# Patient Record
Sex: FEMALE | URBAN - METROPOLITAN AREA
[De-identification: names, ages, dates, MRNs, and addresses within clinical notes are randomized per-mention and may not be internally consistent; named-entity substitution may affect disease eponyms.]

---

## 2018-11-06 ENCOUNTER — HOSPITAL ENCOUNTER (OUTPATIENT)
Dept: PHYSICAL THERAPY | Age: 33
Discharge: HOME | End: 2018-11-06
Payer: OTHER GOVERNMENT

## 2018-11-06 PROCEDURE — 97530 THERAPEUTIC ACTIVITIES: CPT

## 2018-11-06 PROCEDURE — 97162 PT EVAL MOD COMPLEX 30 MIN: CPT
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
## 2018-11-13 ENCOUNTER — HOSPITAL ENCOUNTER (OUTPATIENT)
Dept: PHYSICAL THERAPY | Age: 33
Discharge: HOME | End: 2018-11-13
Payer: OTHER GOVERNMENT

## 2018-11-13 PROCEDURE — 97140 MANUAL THERAPY 1/> REGIONS: CPT

## 2018-11-13 PROCEDURE — 97110 THERAPEUTIC EXERCISES: CPT

## 2018-11-13 NOTE — PROGRESS NOTES
PELVIC FLOOR DAILY TREATMENT NOTE  Patient Name: Fara Flower Date:2018 : 1985 [x]  Patient  Verified Payor: ANTONETTE / Plan: Spring Brown / Product Type: Nicole Otis / In time:940  Out time:1020 Total Treatment Time (min): 40 Total Timed Codes (min):  
1:1 Treatment Time (min):   
Visit #: 2 of 12 Treatment Area: Pelvic and perineal pain [R10.2] SUBJECTIVE Pain Level (0-10 scale): 5 Any medication changes, allergies to medications, adverse drug reactions, diagnosis change, or new procedure performed?: [x] No    [] Yes (see summary sheet for update) Subjective functional status/changes:   [] No changes reported 
I'm on the tail end of my period so pain is a little more today, I feel it in my low abdomen and inside Able to tolerate a \"light\" tampon during menstrual cycles OBJECTIVE Modality rationale: decrease pain to improve the patients ability to tolerate manual therapy Type Additional Details  
[] Estim: []Att   []Unatt        []TENS instruct []IFC  []Premod   []NMES []Other:  []w/US   []w/ice   []w/heat Position: Location:  
[]  Ultrasound: []Continuous   [] Pulsed []1MHz   []3MHz Location: 
W/cm2:  
[]  Iontophoresis with dexamethasone Location: [] Take home patch  
[] In clinic  
[]  Ice     [x]  Heat--pre-tx 
[]  Ice massage Position: supine Location: low abdominal and groin  
[] Skin assessment post-treatment:  []intact []redness- no adverse reaction 
     []redness  adverse reaction:  
 
8 min Therapeutic Exercise:  [x] See flow sheet : 
[]  Pelvic floor strengthening                [x]  Pelvic floor downtraining 
[]  Quality pelvic floor contractions      [x]  Relaxation techniques []  Urge suppression exercises 
[x]  Other: breathing Rationale: increase ROM and decrease tone to improve the patients ability to perform ADLs without pain 22 min Manual Therapy: manual trigger point release to rectus and transverse abdominis mm, skin rolling over abdomen, MFR to low abdomen, mm play to B adductors and quads Rationale: decrease mm tone and pain for ADL tolerance T/o tx min Patient Education: [x] Review HEP    [] Progressed/Changed HEP based on:  
[] positioning   [] body mechanics   [] transfers   [] heat/ice application Other Objective/Functional Measures:  
 []baseline resting tone: nv 
 []slow twitch contractions: nv 
 []fast twitch contractions: nv 
 []PERFect score [Power (MMT), Endurance contraction length, Reps, Fast twitch reps]:  
     NA. 
 [x]most tender to palpation in low abdomen, TrPs throughout abdominals Pain Level (0-10 scale) post treatment: 6-7 ASSESSMENT/Changes in Function: good tolerance Patient will continue to benefit from skilled PT services to modify and progress therapeutic interventions, address ROM deficits, address strength deficits, analyze and address soft tissue restrictions and instruct in home and community integration to attain remaining goals. []  See Plan of Care 
[]  See progress note/recertification 
[]  See Discharge Summary Progress towards goals / Updated goals: 
   1) Patient compliant with HEP. 2) Patient will report > or = 25% improvement in pain with sitting, walking, intercourse. 3) Assess tone of PF mm via biofeedback. 4) The patient will be able to tolerate strength testing of pelvic floor muscles, will establish PERF score. PLAN [x]  Upgrade activities as tolerated     [x]  Continue plan of care 
[]  Update interventions per flow sheet      
[]  Discharge due to:_ 
[x]  Other:_biofeedback assessment, external superficial mm release, internal stretching and mm release, possible dilators for at home Aric Avina, PT 11/13/2018  9:50 AM 
 
 
Future Appointments Date Time Provider Louie Peng 12/3/2018  8:00 AM Jf Coe MD [address] [facility]

## 2018-11-20 ENCOUNTER — HOSPITAL ENCOUNTER (OUTPATIENT)
Dept: PHYSICAL THERAPY | Age: 33
Discharge: HOME | End: 2018-11-20
Payer: OTHER GOVERNMENT

## 2018-11-20 PROCEDURE — 97140 MANUAL THERAPY 1/> REGIONS: CPT

## 2018-11-20 NOTE — PROGRESS NOTES
PELVIC FLOOR DAILY TREATMENT NOTE 8- Patient Name: Shantel Ware Date:2018 : 1985 [x]  Patient  Verified Payor:  / Plan: Bubba Ritter / Product Type: Blondell Loss / In time:1250  Out time:115 Total Treatment Time (min): 25 Total Timed Codes (min):  
1:1 Treatment Time (min):   
Visit #: 3 of 12 Treatment Area: Pelvic and perineal pain [R10.2] SUBJECTIVE Pain Level (0-10 scale): 4 Any medication changes, allergies to medications, adverse drug reactions, diagnosis change, or new procedure performed?: [x] No    [] Yes (see summary sheet for update) Subjective functional status/changes:   [] No changes reported Feeling good, less pain today I was sore maybe til the next day after last visit, but then I believe it helped OBJECTIVE 25 min Manual Therapy: MFR and TPR to rectus abdominis, hip adductors, externally to superficial transverse perineal mm, obturator internus; internaly to levator ani mm, B OI; manual stretching of superficial PFm layer Rationale:  
   
T/o tx min Patient Education: [x] Review HEP    [] Progressed/Changed HEP based on:  
[] positioning   [] body mechanics   [] transfers   [] heat/ice application Other Objective/Functional Measures:  
 [x]patient most TTP externally to superficial transverse perineal mm B and obturator internus B Shortened treatment today due to patient being late for scheduled appt. Pain Level (0-10 scale) post treatment: 3 
 
ASSESSMENT/Changes in Function: decreased tone internally as compared to initial visit, patient able to tolerate deeper mm release Patient will continue to benefit from skilled PT services to modify and progress therapeutic interventions, address ROM deficits, analyze and address soft tissue restrictions and instruct in home and community integration to attain remaining goals. []  See Plan of Care 
[]  See progress note/recertification 
[]  See Discharge Summary Progress towards goals / Updated goals: 
  1) Patient compliant with HEP. 2) Patient will report > or = 25% improvement in pain with sitting, walking, intercourse. 3) Assess tone of PF mm via biofeedback. 4) The patient will be able to tolerate strength testing of pelvic floor muscles, will establish PERF score. PLAN [x]  Upgrade activities as tolerated     [x]  Continue plan of care 
[]  Update interventions per flow sheet      
[]  Discharge due to:_ 
[]  Other:_   
 
Maritza Sheldon PT 11/20/2018  5:05 PM 
 
 
Future Appointments Date Time Provider Louie Peng 11/27/2018 10:15 AM Alejandra Smith PT [facility]  
12/3/2018  8:00 AM Amalia Coe MD [facility]  
12/4/2018 11:00 AM Alejandra Smith PT [facility] AT [facility]  
12/11/2018 10:15 AM Alejandra Smith PT [facility] AT [facility]  
12/18/2018 10:15 AM Alejandra Smith PT [facility] AT [facility]

## 2018-11-27 ENCOUNTER — HOSPITAL ENCOUNTER (OUTPATIENT)
Dept: PHYSICAL THERAPY | Age: 33
Discharge: HOME | End: 2018-11-27
Payer: OTHER GOVERNMENT

## 2018-11-27 PROCEDURE — 97140 MANUAL THERAPY 1/> REGIONS: CPT

## 2018-11-27 NOTE — PROGRESS NOTES
PELVIC FLOOR DAILY TREATMENT NOTE 8- Patient Name: Odin Lee Date:2018 : 1985 [x]  Patient  Verified Payor: ANTONETTE / Plan: Greg Hooper 76 / Product Type: Curlene Massing / In time:1025  Out time:1110 Total Treatment Time (min): 45 Total Timed Codes (min):  
1:1 Treatment Time (min):   
Visit #: 4 of 12 Treatment Area: Pelvic and perineal pain [R10.2] SUBJECTIVE Pain Level (0-10 scale): 6 Any medication changes, allergies to medications, adverse drug reactions, diagnosis change, or new procedure performed?: [x] No    [] Yes (see summary sheet for update) Subjective functional status/changes:   [] No changes reported More soreness today. I think a lot of it is stress from seeing family this weekend and just a flareup too. The internal stretches from last visit really helped, I could tell a difference when I left. OBJECTIVE Modality rationale: decrease pain to improve the patients ability to ambulate Min Type Additional Details  
 [] Estim: []Att   []Unatt        []TENS instruct []IFC  []Premod   []NMES []Other:  []w/US   []w/ice   []w/heat Position: Location:  
 []  Ultrasound: []Continuous   [] Pulsed []1MHz   []3MHz Location: 
W/cm2:  
 []  Iontophoresis with dexamethasone Location: [] Take home patch  
[] In clinic  
during manual tx []  Ice     [x]  heat 
[]  Ice massage Position/location: low back, low abdomen in supine during manual therapy  
[] Skin assessment post-treatment:  []intact []redness- no adverse reaction 
     []redness  adverse reaction: 
 
45 min Manual Therapy: manual TPR externally to B obturator internus, superficial transvers perineal mm B f/b internal TPR to levator ani mm and obturator internus B, as well as static manual stretching. Manual TPR and skill rolling/tissue massage to abdominals Rationale: decrease mm tone and pain for ambulation, fxl mobility T/o tx min Patient Education: [x] Review HEP    [] Progressed/Changed HEP based on:  
[] positioning   [] body mechanics   [] transfers   [] heat/ice application Other Objective/Functional Measures:  
 [x]increased tone and tension internally, but decreased TrPs present in abdominals Pain Level (0-10 scale) post treatment: 5 
 
ASSESSMENT/Changes in Function: today's pain was inreased from outside fators, able to decrease tone and pain with manual treatment. Added another day of skilled PT per week to address more exercise and pain with increased frequency Patient will continue to benefit from skilled PT services to modify and progress therapeutic interventions, address ROM deficits, address strength deficits, analyze and address soft tissue restrictions, analyze and cue movement patterns, assess and modify postural abnormalities and instruct in home and community integration to attain remaining goals. []  See Plan of Care 
[]  See progress note/recertification 
[]  See Discharge Summary Progress towards goals / Updated goals: 
 1) Patient compliant with HEP.   
               2) Patient will report > or = 25% improvement in pain with sitting, walking, intercourse.   
               3) Assess tone of PF mm via biofeedback.  
               4) The patient will be able to tolerate strength testing of pelvic floor muscles, will establish PERF score.  
 
Will reassess at next Tuesday visit, 12/4/18 PLAN [x]  Upgrade activities as tolerated     [x]  Continue plan of care 
[]  Update interventions per flow sheet      
[]  Discharge due to:_ 
[]  Other:_   
 
Sarah Nava, PT 11/27/2018  10:28 AM 
 
 
Future Appointments Date Time Provider Louie Peng 12/3/2018  8:00 AM Pricilla Coe MD [facility] SCHED  
12/4/2018 11:00 AM Nany Jimenez, PT REH[facility] AT [facility]  
12/11/2018 10:15 AM Nany Jimenez, PT RE[facility]ENTER AT [facility]  
 12/18/2018 10:15 AM Wesly Seo PT [facility] AT [facility]

## 2018-12-04 ENCOUNTER — APPOINTMENT (OUTPATIENT)
Dept: PHYSICAL THERAPY | Age: 33
End: 2018-12-04
Payer: OTHER GOVERNMENT

## 2018-12-06 ENCOUNTER — APPOINTMENT (OUTPATIENT)
Dept: PHYSICAL THERAPY | Age: 33
End: 2018-12-06
Payer: OTHER GOVERNMENT

## 2018-12-11 ENCOUNTER — APPOINTMENT (OUTPATIENT)
Dept: PHYSICAL THERAPY | Age: 33
End: 2018-12-11
Payer: OTHER GOVERNMENT

## 2018-12-13 ENCOUNTER — APPOINTMENT (OUTPATIENT)
Dept: PHYSICAL THERAPY | Age: 33
End: 2018-12-13
Payer: OTHER GOVERNMENT

## 2018-12-18 ENCOUNTER — HOSPITAL ENCOUNTER (OUTPATIENT)
Dept: PHYSICAL THERAPY | Age: 33
Discharge: HOME | End: 2018-12-18
Payer: OTHER GOVERNMENT

## 2018-12-18 PROCEDURE — 97140 MANUAL THERAPY 1/> REGIONS: CPT

## 2018-12-18 PROCEDURE — 97110 THERAPEUTIC EXERCISES: CPT

## 2018-12-18 NOTE — PROGRESS NOTES
PELVIC FLOOR DAILY TREATMENT NOTE 8-14    Patient Name: Mellissa Isaac  Date:2018  : 1985  [x]  Patient  Verified  Payor:  / Plan: Galina Miller / Product Type:  /    In time:1015  Out time:1105  Total Treatment Time (min): 50  Total Timed Codes (min):   1:1 Treatment Time (min):    Visit #: 5 of 12    Treatment Area: Pelvic and perineal pain [R10.2]    SUBJECTIVE  Pain Level (0-10 scale): 3  Any medication changes, allergies to medications, adverse drug reactions, diagnosis change, or new procedure performed?: [x] No    [] Yes (see summary sheet for update)  Subjective functional status/changes:   [] No changes reported  Does not hurt as much when I pee  Sex is much more tolerable, I haven't torn as much   I just finished my period, and the days after are the most smooth sailing    OBJECTIVE  Modality rationale: decrease pain and increase tissue extensibility to improve the patients ability to ambulate   Min Type Additional Details    [] Estim: []Att   []Unatt        []TENS instruct                  []IFC  []Premod   []NMES                     []Other:  []w/US   []w/ice   []w/heat  Position:  Location:    []  Ultrasound: []Continuous   [] Pulsed                           []1MHz   []3MHz Location:  W/cm2:    []  Iontophoresis with dexamethasone         Location: [] Take home patch   [] In clinic   10 []  Ice     [x]  heat  []  Ice massage Position: supine  Location: pelvis and groin   [] Skin assessment post-treatment:  []intact []redness- no adverse reaction       []redness  adverse reaction:     10 min Therapeutic Exercise:  [x] See flow sheet :  []  Pelvic floor strengthening                [x]  Pelvic floor downtraining  []  Quality pelvic floor contractions      [x]  Relaxation techniques  []  Urge suppression exercises  []  Other:    Rationale: increase ROM to improve the patients ability to ambulate    30 min Manual Therapy: internal reassessment of PERF, manual TPR to levator ani and layer 1 mm   Rationale: decrease pain and improve ROM/PF excursion for ambulation and ADLs      T/o tx min Patient Education: [x] Review HEP    [] Progressed/Changed HEP based on:   [] positioning   [] body mechanics   [] transfers   [] heat/ice application        Other Objective/Functional Measures:    []baseline resting tone: nv   []slow twitch contractions: nv   []fast twitch contractions: nv   [x]PERFect score [Power (MMT), Endurance contraction length, Reps, Fast twitch reps]:        3/10/10/10. Pain Level (0-10 scale) post treatment: 5    ASSESSMENT/Changes in Function: patient demonstrating improvements in pain level and insertional activities    Patient will continue to benefit from skilled PT services to modify and progress therapeutic interventions, address ROM deficits, analyze and address soft tissue restrictions, analyze and cue movement patterns, analyze and modify body mechanics/ergonomics and instruct in home and community integration to attain remaining goals.      []  See Plan of Care  [x]  See progress note/recertification  []  See Discharge Summary         Progress towards goals / Updated goals:  See PN    PLAN  [x]  Upgrade activities as tolerated     [x]  Continue plan of care  []  Update interventions per flow sheet       []  Discharge due to:_  []  Other:_      Dina Silver, PT 12/18/2018  10:22 AM      Future Appointments   Date Time Provider Louie Peng   12/20/2018  1:00 PM Dajuan Pena, PT REH[facility]ENTER AT [facility]   12/27/2018 11:00 AM Lidia Hurt, PT REH[facility]ENTER AT [facility]

## 2018-12-18 NOTE — PROGRESS NOTES
[address] PHYSICAL THERAPY AT [address], [address] [place] [address], [address] [facility] [address] [address], [address] [place] [address]  Phone: [phone]  Fax: [phone]  PROGRESS NOTE  Patient Name: Olivia Hoyt : 1985   Treatment/Medical Diagnosis: Pelvic and perineal pain [R10.2]   Referral Source: Saud Dalal *     Date of Initial Visit: 18 Attended Visits: 5 Missed Visits: 1     SUMMARY OF TREATMENT: initial evaluation, therapeutic activity, therapeutic exercise, manual therapy, patient education    GOALS:  1) Patient compliant with HEP. 2) Patient will report > or = 25% improvement in pain with sitting, walking, intercourse. 3) Assess tone of PF mm via biofeedback. 4) The patient will be able to tolerate strength testing of pelvic floor muscles, will establish PERF score. Patient is compliant with all HEP instructions  Patient reports > 25% improvement in pain with all ADLs  Biofeedback assessment deferred due to time constraints  PERF score 3/10/10/10, without pain during testing, but residual pain after. KEY FUNCTIONAL CHANGES/PROGRESS: The patient has progressed well with skilled therapy in regards to pain level with ADLs. Patient demonstrates WNL gait pattern indicating less pelvic pain and reports 3/10 pain today. She was able to tolerate mm strength testing of the pelvic floor muscles without discomfort and demonstrates WNL strength. We have discussed the use of dilator therapy and patient is ordering a set for static stretching of the pelvic floor muscles. She continues to strain and have some difficulty with voiding. NEW GOALS to be achieved in __4-6__  Weeks:  1) Patient independent in HEP. 2) Patient will report > or = 50% improvement in pain with sitting, walking, intercourse. 3) Decrease score on FOTO/Pelvic Pain to < or = 57 to indicate improved quality of life.     4) Patient to demo WNL resting tone of PF mm for relaxed voiding and painfree ADLs.    RECOMMENDATIONS: Continue skilled PT services as above 2x4-6 weeks. If you have any questions/comments please contact us directly at [phone]. Thank you for allowing us to assist in the care of your patient. Therapist Signature: Vasquez Reid, PT, DPT, MTC, CMTPT Date: 12/18/2018     Time: 11:03 AM   NOTE TO PHYSICIAN:  PLEASE COMPLETE THE ORDERS BELOW AND FAX TO   [facility]: [phone]. If you are unable to process this request in 24 hours please contact our office: [phone].    ___ I have read the above report and request that my patient continue as recommended.   ___ I have read the above report and request that my patient continue therapy with the following changes/special instructions:_________________________________________________________   ___ I have read the above report and request that my patient be discharged from therapy.      Physician Signature:        Date:       Time:

## 2018-12-20 ENCOUNTER — HOSPITAL ENCOUNTER (OUTPATIENT)
Dept: PHYSICAL THERAPY | Age: 33
Discharge: HOME | End: 2018-12-20
Payer: OTHER GOVERNMENT

## 2018-12-20 PROCEDURE — 97140 MANUAL THERAPY 1/> REGIONS: CPT | Performed by: PHYSICAL THERAPIST

## 2018-12-20 NOTE — PROGRESS NOTES
PHYSICAL THERAPY - DAILY TREATMENT NOTE    Patient Name: Kwan Garcia        Date: 2018  : 1985   YES Patient  Verified  Visit #:   6 (1)   of   12  Insurance: Payor:  / Plan: Britt Mckenna / Product Type:  /      In time: 2 Out time: 250   Total Treatment Time: 50     TREATMENT AREA =  Pelvic and perineal pain [R10.2]    SUBJECTIVE  Pain Level (on 0 to 10 scale):  4  / 10   Medication Changes/New allergies or changes in medical history, any new surgeries or procedures? NO    If yes, update Summary List   Subjective Functional Status/Changes:  []  No changes reported     Functional improvements: tolerates touching abdomen is easier  Functional impairments: ongoing pain in abdomen. OBJECTIVE    50 min Manual Therapy: Technique:      Visceral manipulation: ICV, Sphincter of Oddi, pyloric sphincter releases. Duodenum release. Rationale:      decrease pain, increase ROM and increase tissue extensibility to improve patient's ability to  improve patient's ability to perform ADL's with decreased pain        throughout therapy min Patient Education:  YES  Reviewed HEP   []  Progressed/Changed HEP based on: Other Objective/Functional Measures:    Tight through abdomen     Post Treatment Pain Level (on 0 to 10) scale:   3  / 10     ASSESSMENT  Assessment/Changes in Function:     The pt reported increased \"release\" less tension in lower abdomen post visceral.       []  See Progress Note/Recertification   Patient will continue to benefit from skilled PT services to modify and progress therapeutic interventions, address functional mobility deficits, address ROM deficits, address strength deficits, analyze and address soft tissue restrictions and analyze and cue movement patterns to attain remaining goals.    Progress toward goals / Updated goals:    Progressing towards goals, will monitor and progress as able        PLAN  [x]  Upgrade activities as tolerated YES Continue plan of care   []  Discharge due to :    []  Other:      Therapist: Deb Meade PT    Date: 12/20/2018 Time: 2:06 PM     Future Appointments   Date Time Provider Louie Peng   12/27/2018 11:00 AM Levon Dobbins, PT [facility] AT [facility]   1/3/2019 11:45 AM Nany Jimenez, PT [facility] AT [facility]   1/8/2019 11:45 AM Nany Jimenez, PT [facility] AT [facility]

## 2018-12-27 ENCOUNTER — HOSPITAL ENCOUNTER (OUTPATIENT)
Dept: PHYSICAL THERAPY | Age: 33
Discharge: HOME | End: 2018-12-27
Payer: OTHER GOVERNMENT

## 2018-12-27 PROCEDURE — 97140 MANUAL THERAPY 1/> REGIONS: CPT | Performed by: PHYSICAL THERAPIST

## 2018-12-27 PROCEDURE — 97110 THERAPEUTIC EXERCISES: CPT | Performed by: PHYSICAL THERAPIST

## 2018-12-27 NOTE — PROGRESS NOTES
PHYSICAL THERAPY - DAILY TREATMENT NOTE    Patient Name: Adrián Blake        Date: 2018  : 1985   YES Patient  Verified  Visit #:   7 (2)   of   12  Insurance: Payor:  / Plan: Allie Eastman / Product Type:  /      In time:  Out time:    Total Treatment Time: 50     TREATMENT AREA =  Pelvic and perineal pain [R10.2]    SUBJECTIVE  Pain Level (on 0 to 10 scale):  8  / 10   Medication Changes/New allergies or changes in medical history, any new surgeries or procedures? NO    If yes, update Summary List   Subjective Functional Status/Changes:  []  No changes reported     C/o significant LBP today to B paraspinals likely secondary to standing and cooking for very long hours. Barely able to walk today. Reports that she felt better after last visit to her lower abdomen. OBJECTIVE    40 min Manual Therapy: Technique:      MET to correct R anterior, then posterior rotation, R hip innominant lateral rotation, L on L unilateral L sacrum flexed, unilateral R sacrum extended. TrP release to B glute med. Rationale:      decrease pain, increase ROM and increase tissue extensibility to improve patient's ability to tolerate standing. 10 min Therapeutic Exercise:  [x]  See flow sheet   Rationale:      increase ROM and increase strength to improve the patients ability to tolerate rolling/standing. throughout therapy min Patient Education:  YES  Reviewed HEP   []  Progressed/Changed HEP based on: Other Objective/Functional Measures:    Instructed the pt in proper body mechanics for log rolling, golfers' reach, squat to avoid rotating. Instructed to order SI belt. R hip squish test (+). R hip innom  Level following session. Post Treatment Pain Level (on 0 to 10) scale:   7  / 10     ASSESSMENT  Assessment/Changes in Function:     Slight reduction in pain after SI mobs. Trigger points to B glut med very painful to release.   Attempted to instruct in Pre-pilates for core stab. She tolerated SI alignment and was aligned following session. TA contraction went well, but BKFO increased abdominal pain. []  See Progress Note/Recertification   Patient will continue to benefit from skilled PT services to modify and progress therapeutic interventions, address functional mobility deficits, address ROM deficits, address strength deficits, analyze and address soft tissue restrictions and analyze and cue movement patterns to attain remaining goals.    Progress toward goals / Updated goals:    Progressing towards goals, will monitor and progress as able        PLAN  [x]  Upgrade activities as tolerated YES Continue plan of care   []  Discharge due to :    []  Other:      Therapist: Shanthi Skinner, PT    Date: 12/27/2018 Time: 10:18 AM     Future Appointments   Date Time Provider Louie Peng   12/27/2018 11:00 AM Ivelisse Hurt, PT [facility] AT [facility]   1/3/2019 11:45 AM Dang Link, PT [facility] AT [facility]   1/8/2019 11:45 AM Dang Link, PT RE[facility]ENTER AT [facility]

## 2019-01-03 ENCOUNTER — HOSPITAL ENCOUNTER (OUTPATIENT)
Dept: PHYSICAL THERAPY | Age: 34
Discharge: HOME | End: 2019-01-03
Payer: OTHER GOVERNMENT

## 2019-01-03 PROCEDURE — 97140 MANUAL THERAPY 1/> REGIONS: CPT

## 2019-01-03 PROCEDURE — 97110 THERAPEUTIC EXERCISES: CPT

## 2019-01-03 NOTE — PROGRESS NOTES
PELVIC FLOOR DAILY TREATMENT NOTE 8- Patient Name: Sherman Foy Date:1/3/2019 : 1985 [x]  Patient  Verified Payor: ANTONETTE / Plan: Alley Agustin / Product Type: Clemon Hope / In time:12  Out time:1235 Total Treatment Time (min): 30 Total Timed Codes (min):  
1:1 Treatment Time (min):   
Visit #: 8 of 12 Treatment Area: Pelvic and perineal pain [R10.2] SUBJECTIVE Pain Level (0-10 scale): 10 Any medication changes, allergies to medications, adverse drug reactions, diagnosis change, or new procedure performed?: [x] No    [] Yes (see summary sheet for update) Subjective functional status/changes:   [] No changes reported \"I'm still in so much pain from the pelvic manipulation, but i've been wearing my belt\" OBJECTIVE Modality rationale: decrease pain and increase tissue extensibility to improve the patients ability to perform ADLs, sleep Type Additional Details  
[] Estim: []Att   []Unatt        []TENS instruct []IFC  []Premod   []NMES []Other:  []w/US   []w/ice   []w/heat Position: Location:  
[]  Ultrasound: []Continuous   [] Pulsed []1MHz   []3MHz Location: 
W/cm2:  
[]  Iontophoresis with dexamethasone Location: [] Take home patch  
[] In clinic  
[]  Ice     [x]  heat 
[]  Ice massage Position: supine with LEs elevated Location: thoracolumbar  
[] Skin assessment post-treatment:  []intact []redness- no adverse reaction 
     []redness  adverse reaction:  
 
15 min Therapeutic Exercise:  [x]  See flow sheet: pelvic breathing, cat-cow, child's pose, HEP review, SI belt use (overlapped with heat treatment) Rationale: decrease pain for ADLs 15 min Manual Therapy: ES mm play, STM/DTM, TPR to B thoraco paraspinals and B QLs Rationale: decrease trigger points and pain to improve tissue extensibility for ADLs, sleeping T/o tx min Patient Education: [x] Review HEP    [] Progressed/Changed HEP based on:  
[] positioning   [] body mechanics   [] transfers   [] heat/ice application Other Objective/Functional Measures:  
 Patient reports 10-20% decrease in pain post-manual treatment Patient visibly in a lot of pain Pelvic alignment and LE length even and WNL Cut treatment time short because of patient's pain level Pain Level (0-10 scale) post treatment: 8 
 
ASSESSMENT/Changes in Function: Due to patient's level pelvis, I advised her to decrease wear time of the SI belt, with the goal  Being to decrease some of the thoracolumbar mm guarding, She was unable to tolerate any other treatment today due to pain. Patient will continue to benefit from skilled PT services to modify and progress therapeutic interventions, address functional mobility deficits, address ROM deficits, address strength deficits, analyze and address soft tissue restrictions, analyze and cue movement patterns, analyze and modify body mechanics/ergonomics, assess and modify postural abnormalities and instruct in home and community integration to attain remaining goals. []  See Plan of Care 
[]  See progress note/recertification 
[]  See Discharge Summary Progress towards goals / Updated goals: Working toward WNL pelvic floor mm tone PLAN [x]  Upgrade activities as tolerated     [x]  Continue plan of care 
[]  Update interventions per flow sheet      
[]  Discharge due to:_ 
[]  Other:_   
 
Radha Kapadia, PT 1/3/2019  12:43 PM 
 
 
Future Appointments Date Time Provider Louie Peng 1/8/2019 11:45 AM Ron Handy, PT [facility] AT [facility]

## 2019-01-08 ENCOUNTER — HOSPITAL ENCOUNTER (OUTPATIENT)
Dept: PHYSICAL THERAPY | Age: 34
Discharge: HOME | End: 2019-01-08
Payer: OTHER GOVERNMENT

## 2019-01-08 PROCEDURE — 97530 THERAPEUTIC ACTIVITIES: CPT

## 2019-01-08 PROCEDURE — 97110 THERAPEUTIC EXERCISES: CPT

## 2019-01-08 PROCEDURE — 97140 MANUAL THERAPY 1/> REGIONS: CPT

## 2019-01-08 NOTE — PROGRESS NOTES
PELVIC FLOOR DAILY TREATMENT NOTE 8- Patient Name: Celso Bronson Date:2019 : 1985 [x]  Patient  Verified Payor: ANTONETTE / Plan: Greg Hooper 74 / Product Type: Nonnie Critz / In time:1150  Out time:1245 Total Treatment Time (min): 55 Total Timed Codes (min):  
1:1 Treatment Time (min):   
Visit #: 9 of 12 Treatment Area: Pelvic and perineal pain [R10.2] SUBJECTIVE Pain Level (0-10 scale): 5 Any medication changes, allergies to medications, adverse drug reactions, diagnosis change, or new procedure performed?: [x] No    [] Yes (see summary sheet for update) Subjective functional status/changes:   [] No changes reported Functional improvement: increased mobility and decreased pain since last visit Functional limitations: increased pelvic pain due to being on menstrual cycle OBJECTIVE Modality rationale: decrease pain and increase tissue extensibility to improve the patients ability to perform ADLs Min Type Additional Details  
 [] Estim: []Att   []Unatt        []TENS instruct []IFC  []Premod   []NMES []Other:  []w/US   []w/ice   []w/heat Position: Location:  
 []  Ultrasound: []Continuous   [] Pulsed []1MHz   []3MHz Location: 
W/cm2:  
 []  Iontophoresis with dexamethasone Location: [] Take home patch  
[] In clinic  
10 []  Ice     [x]  heat 
[]  Ice massage Position: supine with LEs over bolster Location: lumbar spine, ant pelvis  
[x] Skin assessment post-treatment:  [x]intact []redness- no adverse reaction 
     []redness  adverse reaction:  
 
25 min Therapeutic Exercise:  [x] See flow sheet : 
[]  Pelvic floor strengthening                [x]  Pelvic floor downtraining 
[]  Quality pelvic floor contractions      [x]  Relaxation techniques []  Urge suppression exercises 
[x]  Other: pelvic breathing, pelvic clock, pre-Pilates Rationale: increase strength, improve coordination and increase proprioception to improve the patients ability to perform ADLs 10 min Therapeutic Activity:  [x]  See flow sheet: relaxed voiding strategies, use of warm perineal washer bottle 
 [] Bladder Training: [] voiding schedule          [] program progression 
                                  [] decrease every  minutes/hours Rationale: decrease mm tone, enhance relaxation for voiding 10 min Manual Therapy: manual TPR to B QL/PSs, glute max and pirifiormis in supine Rationale: decrease TrPs and pain for ADLs 
   
T/o tx min Patient Education: [x] Review HEP    [] Progressed/Changed HEP based on:  
[] positioning   [] body mechanics   [] transfers   [] heat/ice application Other Objective/Functional Measures:  
Patient demos good control with pelvic clock and TA contraction She reports \"pinching\" in her low back with oblique pelvic movements Increased tone and TrPs in B glutes/piriformis mm 
 
Pain Level (0-10 scale) post treatment: 5 
 
ASSESSMENT/Changes in Function: difficult to differentiate menstrual cycle pain and patient's usual pelvic pain today. Focused on pelvic control and breath for advancing abdominal strengthening. Patient will continue to benefit from skilled PT services to modify and progress therapeutic interventions, address ROM deficits, address strength deficits, analyze and address soft tissue restrictions, analyze and cue movement patterns, analyze and modify body mechanics/ergonomics and instruct in home and community integration to attain remaining goals. []  See Plan of Care 
[]  See progress note/recertification 
[]  See Discharge Summary Progress towards goals / Updated goals: 
Pain increases lately due to menstrual cycle and somatovisceral tissue release, but overall pain steadily decreasing PLAN [x]  Upgrade activities as tolerated     [x]  Continue plan of care []  Update interventions per flow sheet      
[]  Discharge due to:_ 
[]  Other:_   
 
Prince Valentino, PT 1/8/2019  11:54 AM 
 
 
No future appointments.

## 2019-01-15 ENCOUNTER — APPOINTMENT (OUTPATIENT)
Dept: PHYSICAL THERAPY | Age: 34
End: 2019-01-15
Payer: OTHER GOVERNMENT

## 2019-01-17 ENCOUNTER — APPOINTMENT (OUTPATIENT)
Dept: PHYSICAL THERAPY | Age: 34
End: 2019-01-17
Payer: OTHER GOVERNMENT

## 2019-01-22 ENCOUNTER — APPOINTMENT (OUTPATIENT)
Dept: PHYSICAL THERAPY | Age: 34
End: 2019-01-22
Payer: OTHER GOVERNMENT

## 2019-01-24 ENCOUNTER — APPOINTMENT (OUTPATIENT)
Dept: PHYSICAL THERAPY | Age: 34
End: 2019-01-24
Payer: OTHER GOVERNMENT

## 2019-01-31 ENCOUNTER — APPOINTMENT (OUTPATIENT)
Dept: PHYSICAL THERAPY | Age: 34
End: 2019-01-31
Payer: OTHER GOVERNMENT

## 2019-02-05 ENCOUNTER — APPOINTMENT (OUTPATIENT)
Dept: PHYSICAL THERAPY | Age: 34
End: 2019-02-05
Payer: OTHER GOVERNMENT

## 2019-02-07 ENCOUNTER — HOSPITAL ENCOUNTER (OUTPATIENT)
Dept: PHYSICAL THERAPY | Age: 34
Discharge: HOME | End: 2019-02-07
Payer: OTHER GOVERNMENT

## 2019-02-07 PROCEDURE — 97140 MANUAL THERAPY 1/> REGIONS: CPT | Performed by: PHYSICAL THERAPIST

## 2019-02-07 PROCEDURE — 97110 THERAPEUTIC EXERCISES: CPT | Performed by: PHYSICAL THERAPIST

## 2019-02-07 NOTE — PROGRESS NOTES
Jack Perez [facility] PHYSICAL THERAPY AT [address] [facility], [address] [place] [address] [place], [address] [place] [address] Drive, [address] [place] [address]  Phone: [phone]  Fax: [phone] PROGRESS NOTE Patient Name: Dagoberto Yousif : 1985 Treatment/Medical Diagnosis: Pelvic and perineal pain [R10.2] Referral Source: Jose Miguel Soares * Date of Initial Visit: 18 Attended Visits: 10 Missed Visits: 3 SUMMARY OF TREATMENT: initial evaluation, therapeutic activity, therapeutic exercise, manual therapy, patient education GOALS: 
 1) Patient independent in HEP. 2) Patient will report > or = 50% improvement in pain with sitting, walking, intercourse. 3) Decrease score on FOTO/Pelvic Pain to < or = 57 to indicate improved quality of life. 4) Patient to demo WNL resting tone of PF mm for relaxed voiding and painfree ADLs. Patient is compliant with all HEP instructions Patient reports > 25% improvement in pain and ADLs. Encouraged by pelvic floor getting slightly more relaxed as she had slight urinary incontinence. FOTO deferred this date due to time constraint. The pt reports 5% increased tolerance for intercourse. Biofeedback assessment deferred due to time constraints KEY FUNCTIONAL CHANGES/PROGRESS: The patient has progressed well with skilled therapy in regards to pain level with ADLs. Patient demonstrates WNL gait pattern indicating less pelvic pain and reports 5/10 pain today. She is demonstrating a little less straining with voiding as she reports one incidence of \"dribbling\" a few drops. NEW GOALS to be achieved in __4-6__  Weeks: 
1) Patient independent in HEP. 2) Patient will report > or = 50% improvement in pain with sitting, walking, intercourse. Able to void with increased ease and had \"dribbles\". 3) Decrease score on FOTO/Pelvic Pain to < or = 57 to indicate improved quality of life. 4) Patient to demo WNL resting tone of PF mm for relaxed voiding and painfree ADLs. RECOMMENDATIONS: Continue skilled PT services as above 2x4-6 weeks. If you have any questions/comments please contact us directly at [phone]. Thank you for allowing us to assist in the care of your patient. Therapist Signature: Faiza Carty PT Date: 2/7/2019 Time: 11:03 AM  
NOTE TO PHYSICIAN:  PLEASE COMPLETE THE ORDERS BELOW AND FAX TO [facility] Physical Therapy: [phone]. If you are unable to process this request in 24 hours please contact our office: [phone]. 
 
___ I have read the above report and request that my patient continue as recommended.  
___ I have read the above report and request that my patient continue therapy with the following changes/special instructions:_________________________________________________________  
___ I have read the above report and request that my patient be discharged from therapy.   
 
Physician Signature:       Date:      Time:

## 2019-02-07 NOTE — PROGRESS NOTES
PHYSICAL THERAPY - DAILY TREATMENT NOTE Patient Name: Josef Lord        Date: 2019 : 1985   YES Patient  Verified Visit #:   10   of   12  Insurance: Payor:  / Plan: Sunil Bernard / Product Type: Court Santamaria / In time: 1030 Out time: 1115 Total Treatment Time: 45 Medicare/BCBS Time Tracking (below) Total Timed Codes (min):  - 1:1 Treatment Time:  -  
TREATMENT AREA =  Pelvic and perineal pain [R10.2] SUBJECTIVE Pain Level (on 0 to 10 scale):  5  / 10 Medication Changes/New allergies or changes in medical history, any new surgeries or procedures? NO    If yes, update Summary List  
Subjective Functional Status/Changes:  []  No changes reported The pt states that she's missed PT due to stomach illness and traveled out of town. Doing about 15% better. R hip went into spasm last night with walking. Reports that she is doing better with ability to relax her pelvic floor and even had a few \"dribbles\" before reaching toilet. OBJECTIVE Modalities Rationale:     decrease pain to improve patient's ability to  improve patient's ability to perform ADL's with decreased pain 
 
 min [] Estim, type/location:   
                                 []  att     []  unatt     []  w/US     []  w/ice    []  w/heat 
 min []  Mechanical Traction: type/lbs   
               []  pro   []  sup   []  int   []  cont    []  before manual    []  after manual  
 min []  Ultrasound, settings/location:    
 min []  Iontophoresis w/ dexamethasone, location:   
                                           []  take home patch       []  in clinic  
10 min []  Ice     [x]  Heat    location/position: Lumbar, R hip  
 min []  Vasopneumatic Device, press/temp:   
 min []  Other:   
[x] Skin assessment post-treatment (if applicable):   
[x]  intact    []  redness- no adverse reaction    
[]redness  adverse reaction:   
 
15 min Therapeutic Exercise:  [x]  See flow sheet Rationale:      increase ROM and increase strength to improve the patients ability to have reduced back pain for walking. 20 min Manual Therapy: Technique:     
[x] S/DTM []IASTM []PROM [] Passive Stretching  
[]manual TPR []Jt manipulation:Gr I [] II []  III [] IV[] V[] Treatment Area:  R adductor, R QL and glut med Rationale:      decrease pain, increase ROM and increase tissue extensibility to improve patient's ability to sit, walk, have intercourse by relaxing tone to pelvic floor Billed With/As: 
 [] TE 
 [] TA 
 [] Neuro 
 [] Self Care Patient Education: [x] Review HEP [] Progressed/Changed HEP based on:  
[] positioning   [] body mechanics   [] transfers   [] heat/ice application   
[] other:   
Other Objective/Functional Measures: 
 
Spasticity to the R QL, glute med. Tolerates slightly increased pressure to adductor Post Treatment Pain Level (on 0 to 10) scale:   5  / 10 ASSESSMENT Assessment/Changes in Function:  
 
See re-eval 
  
[]  See Progress Note/Recertification Patient will continue to benefit from skilled PT services to modify and progress therapeutic interventions, address functional mobility deficits, address ROM deficits, address strength deficits, analyze and address soft tissue restrictions, analyze and cue movement patterns, analyze and modify body mechanics/ergonomics and assess and modify postural abnormalities to attain remaining goals. Progress toward goals / Updated goals: 
See PN  
PLAN [x]  Upgrade activities as tolerated YES Continue plan of care  
[]  Discharge due to :   
[]  Other:   
 
Therapist: Aleisha Rosario PT Date: 2/7/2019 Time: 10:36 AM  
 
Future Appointments Date Time Provider Louie Peng 2/12/2019 10:15 AM Loni Serrano PT REHAB [facility]ENTER AT [facility]  
2/19/2019 10:15 AM Loni Serrano PT REH[facility]ENTER AT [facility]  
2/26/2019  9:30 AM Loni Serrano PT REHAB CENTER AT [facility]

## 2019-02-12 ENCOUNTER — HOSPITAL ENCOUNTER (OUTPATIENT)
Dept: PHYSICAL THERAPY | Age: 34
Discharge: HOME | End: 2019-02-12
Payer: OTHER GOVERNMENT

## 2019-02-12 PROCEDURE — 97110 THERAPEUTIC EXERCISES: CPT

## 2019-02-12 NOTE — PROGRESS NOTES
PELVIC FLOOR DAILY TREATMENT NOTE 1- Patient Name: Monique Alvarado        Date: 2019 : 1985   YES  Patient  Verified Visit #:     Insurance: Payor:  / Plan: Vesta Dickerson / Product Type: Creasie Decree / In time: 1025 Out time: 1055 Total Treatment Time: 30 Medicare/[facility] Time Tracking (below) Total Timed Codes (min):   1:1 Treatment Time:    
TREATMENT AREA =  Pelvic and perineal pain [R10.2] SUBJECTIVE Pain Level (on 0 to 10 scale):  6  / 10 Medication Changes/New allergies or changes in medical history, any new surgeries or procedures? No If yes, update Summary List  
Subjective Functional Status/Changes:  []  No changes reported Functional improvement: having less pain overall Functional limitations: pelvic pain with ADLs limiting activity tolerance OBJECTIVE 30 min Therapeutic Exercise:  [x] See flow sheet : 
[]  Pelvic floor strengthening                []  Pelvic floor downtraining 
[]  Quality pelvic floor contractions      []  Relaxation techniques []  Urge suppression exercises 
[]  Other: Biofeedback assessment via sEMG--relaxation, positioning Rationale:      increase ROM, increase strength and improve coordination to improve the patients ability to tolerate ADLs Billed With/As: 
 [x] TE 
 [] TA 
 [] Neuro 
 [] Self Care Patient Education: [x] Review HEP [] Progressed/Changed HEP based on:  
[] positioning   [] body mechanics   [] transfers   [] heat/ice application   
[] other:   
Other Objective/Functional Measures: 
 
sEMG readings: H/L 38 mV Supine with LEs extended 15 mV 
R S/L 51 mV R S/L with B hip FLX 45 mV Supine with ankles crossed 4 mV DKC/happy baby 7 mV Standing 7 mV Post Treatment Pain Level (on 0 to 10) scale:   5  / 10 ASSESSMENT Assessment/Changes in Function:  
 
Patient demonstrated good understanding of the ability to relax and contract pelvic floor muscles w use of BFB []  See Progress Note/Recertification Patient will continue to benefit from skilled PT services to modify and progress therapeutic interventions, address functional mobility deficits, address ROM deficits, address strength deficits, analyze and address soft tissue restrictions, analyze and cue movement patterns, analyze and modify body mechanics/ergonomics, assess and modify postural abnormalities and instruct in home and community integration to attain remaining goals. Progress toward goals / Updated goals: 
1) Patient independent in HEP.   
2) Patient will report > or = 50% improvement in pain with sitting, walking, intercourse.  Able to void with increased ease and had \"dribbles\". 3) Decrease score on FOTO/Pelvic Pain to < or = 57 to indicate improved quality of life.   
4) Patient to demo WNL resting tone of PF mm for relaxed voiding and painfree ADLs. PLAN [x]  Upgrade activities as tolerated yes Continue plan of care  
[]  Discharge due to :   
[]  Other:   
 
Therapist: Marques Mojica PT, DPT, MTC, CMTPT Date: 2/12/2019 Time: 10:35 AM  
 
Future Appointments Date Time Provider Louie Peng 2/19/2019 10:15 AM Rosana Middleton PT REHAB [facility] AT [facility]  
2/26/2019  9:30 AM Rosana Middleton PT REH[facility] AT [facility]

## 2019-02-19 ENCOUNTER — HOSPITAL ENCOUNTER (OUTPATIENT)
Dept: PHYSICAL THERAPY | Age: 34
Discharge: HOME | End: 2019-02-19
Payer: OTHER GOVERNMENT

## 2019-02-19 PROCEDURE — 97110 THERAPEUTIC EXERCISES: CPT

## 2019-02-19 PROCEDURE — 97140 MANUAL THERAPY 1/> REGIONS: CPT

## 2019-02-19 NOTE — PROGRESS NOTES
PELVIC FLOOR DAILY TREATMENT NOTE  Patient Name: Jordyn Watters        Date: 2019 : 1985   YES  Patient  Verified Visit #:   15   of   24  Insurance: Payor: ANTONETTE / Plan: Lloyd Valadez / Product Type: Merry Leak / In time: 1025 Out time: 28 Total Treatment Time: 35 Medicare/[facility] Time Tracking (below) Total Timed Codes (min):   1:1 Treatment Time:    
TREATMENT AREA =  Pelvic and perineal pain [R10.2] SUBJECTIVE Pain Level (on 0 to 10 scale):  6  / 10 Medication Changes/New allergies or changes in medical history, any new surgeries or procedures? No If yes, update Summary List  
Subjective Functional Status/Changes:  []  No changes reported Functional improvement: 10% improvement in intercourse pain Functional limitations: pain throughout ADLs, limited activity tolerance OBJECTIVE 10 min Therapeutic Exercise:  [x] See flow sheet : 
[]  Pelvic floor strengthening                [x]  Pelvic floor downtraining 
[]  Quality pelvic floor contractions      [x]  Relaxation techniques []  Urge suppression exercises 
[]  Other:   
Rationale:      increase ROM and improve coordination to improve the patients ability to tolerate ADLs 25 min Manual Therapy: IASTM to R adductors f/b manual STM; internal TPR to OI, levator ani, cuing for relaxation Rationale:      decrease pain, increase ROM, increase tissue extensibility and decrease trigger points to improve patient's ability to tolerate ADLs Billed With/As: 
 [x] TE 
 [] TA 
 [] Neuro 
 [] Self Care Patient Education: [x] Review HEP [] Progressed/Changed HEP based on:  
[] positioning   [] body mechanics   [] transfers   [] heat/ice application   
[] other:   
Other Objective/Functional Measures: 
 
Patient demos significant tenderness to palpation internally and externally on R side. Obturator internus most tender to palpation.   
Educated patient again in dilator therapy and self-TPR tool as well as relaxation techniques, especially for use pre-intercourse Post Treatment Pain Level (on 0 to 10) scale:   5  / 10 ASSESSMENT Assessment/Changes in Function:  
 
Slowly, but steadily improving pain with ADLs such as intercourse  
[]  See Progress Note/Recertification Patient will continue to benefit from skilled PT services to modify and progress therapeutic interventions, address functional mobility deficits, address ROM deficits, analyze and address soft tissue restrictions, analyze and cue movement patterns, analyze and modify body mechanics/ergonomics and instruct in home and community integration to attain remaining goals. Progress toward goals / Updated goals: 
1) Patient independent in HEP.   
2) Patient will report > or = 50% improvement in pain with sitting, walking, intercourse.  Able to void with increased ease and had \"dribbles\". 3) Decrease score on FOTO/Pelvic Pain to < or = 57 to indicate improved quality of life.   
4) Patient to demo WNL resting tone of PF mm for relaxed voiding and painfree ADLs. PLAN [x]  Upgrade activities as tolerated yes Continue plan of care  
[]  Discharge due to :   
[]  Other:   
 
Therapist: Dawn Hebert, PT, DPT, MTC, CMTPT Date: 2/19/2019 Time: 12:02 PM  
 
Future Appointments Date Time Provider Louie Peng 2/26/2019  9:30 AM Francisco Alvarado PT [facility]  
3/12/2019  9:30 AM Francisco Alvarado, PT [facility] AT [facility]  
3/14/2019 11:30 AM Geri Hurt PT RE[facility] AT [facility]  
3/19/2019  9:30 AM Francisco Alvarado PT [facility]  
3/21/2019 11:30 AM Geri Hurt PT [facility] AT [facility]  
3/26/2019  9:30 AM Francisco Alvarado PT [facility]  
3/28/2019 11:30 AM Geri Hurt, PT RE[facility] AT [facility]

## 2019-02-26 ENCOUNTER — HOSPITAL ENCOUNTER (OUTPATIENT)
Dept: PHYSICAL THERAPY | Age: 34
Discharge: HOME | End: 2019-02-26
Payer: OTHER GOVERNMENT

## 2019-02-26 PROCEDURE — 97140 MANUAL THERAPY 1/> REGIONS: CPT

## 2019-02-26 NOTE — PROGRESS NOTES
PELVIC FLOOR DAILY TREATMENT NOTE 1- Patient Name: Neelima Riojas        Date: 2019 : 1985   YES  Patient  Verified Visit #:   15   of   24  Insurance: Payor: ANTONETTE / Plan: Greg Hooper 74 / Product Type: Lalito Stammer / In time: 935 Out time: 5915 Total Treatment Time: 40 Medicare/[facility] Time Tracking (below) Total Timed Codes (min):   1:1 Treatment Time:    
TREATMENT AREA =  Pelvic and perineal pain [R10.2] SUBJECTIVE Pain Level (on 0 to 10 scale):  5  / 10 Medication Changes/New allergies or changes in medical history, any new surgeries or procedures? No If yes, update Summary List  
Subjective Functional Status/Changes:  []  No changes reported Functional improvement: ordered dilators in order to begin dilator therapy Functional limitations: low back, R inner thigh, internal soreness and pain OBJECTIVE 40 min Manual Therapy: Internal assessment of PF mm tone/TrPs; MFR/DTM to R adductor, B lumbar, piriformis, hip mm; prone sacral scrubbing and manual TPR to B piriformis and glutes Rationale:      decrease pain, increase ROM, increase tissue extensibility, decrease trigger points and increase postural awareness to improve patient's ability to perform ADLs Billed With/As: 
 [x] MT 
 [] TA 
 [] Neuro 
 [] Self Care Patient Education: [x] Review HEP [] Progressed/Changed HEP based on:  
[] positioning   [] body mechanics   [] transfers   [] heat/ice application   
[] other:   
Other Objective/Functional Measures: 
 
Internally, PF mm tone felt normal. No increased tension or guarding felt. Patient very tender to palpation of R adductor. TrPs present t/o lumbar and hip mm, especially in B hip rotators Patient ordered equipment for dilator therapy at home Patient states she read up on vaginismus and it really resonated with her--the pain during and especially after intercourse Post Treatment Pain Level (on 0 to 10) scale:   0  / 10 ASSESSMENT Assessment/Changes in Function:  
 
Improving mm tone/pain Weakness in PF and abdominals evident by occasional (1xweek) OCTAVIO 
  
[]  See Progress Note/Recertification Patient will continue to benefit from skilled PT services to modify and progress therapeutic interventions, address functional mobility deficits, address ROM deficits, address strength deficits, analyze and address soft tissue restrictions, analyze and cue movement patterns, analyze and modify body mechanics/ergonomics, assess and modify postural abnormalities and instruct in home and community integration to attain remaining goals. Progress toward goals / Updated goals: 
1) Patient independent in HEP.   
2) Patient will report > or = 50% improvement in pain with sitting, walking, intercourse.  Able to void with increased ease and had \"dribbles\". 3) Decrease score on FOTO/Pelvic Pain to < or = 57 to indicate improved quality of life.   
4) Patient to demo WNL resting tone of PF mm for relaxed voiding and painfree ADLs. PLAN [x]  Upgrade activities as tolerated yes Continue plan of care  
[]  Discharge due to :   
[]  Other:   
 
Therapist: Elizabeth Tyson, PT, DPT, MTC, CMTPT Date: 2/26/2019 Time: 9:39 AM  
 
Future Appointments Date Time Provider Louie Peng 3/12/2019  9:30 AM Edwin Mayorga, PT [facility] AT [facility]  
3/14/2019 11:30 AM Nhi Hurt, PT [facility] AT [facility]  
3/19/2019  9:30 AM Edwin Lipoma, PT [facility] AT [facility]  
3/21/2019 11:30 AM Nhi Hurt, PT [facility] AT [facility]  
3/26/2019  9:30 AM Edwin Lipoma, PT [facility] AT [facility]  
3/28/2019 11:30 AM Nhi Hurt, PT [facility] AT [facility]

## 2019-03-12 ENCOUNTER — APPOINTMENT (OUTPATIENT)
Dept: PHYSICAL THERAPY | Age: 34
End: 2019-03-12
Payer: OTHER GOVERNMENT

## 2019-03-14 ENCOUNTER — APPOINTMENT (OUTPATIENT)
Dept: PHYSICAL THERAPY | Age: 34
End: 2019-03-14
Payer: OTHER GOVERNMENT

## 2019-03-19 ENCOUNTER — HOSPITAL ENCOUNTER (OUTPATIENT)
Dept: PHYSICAL THERAPY | Age: 34
Discharge: HOME | End: 2019-03-19
Payer: OTHER GOVERNMENT

## 2019-03-19 PROCEDURE — 97110 THERAPEUTIC EXERCISES: CPT

## 2019-03-19 PROCEDURE — 97530 THERAPEUTIC ACTIVITIES: CPT

## 2019-03-19 PROCEDURE — 97140 MANUAL THERAPY 1/> REGIONS: CPT

## 2019-03-19 NOTE — PROGRESS NOTES
PELVIC FLOOR DAILY TREATMENT NOTE  Patient Name: Willy Bryant        Date: 3/19/2019 : 1985   YES  Patient  Verified Visit #:     Insurance: Payor: ANTONETTE / Plan: Nasreen Vallejo / Product Type: Armando Ramírez / In time: 940 Out time: 1030 Total Treatment Time: 50 Medicare/[facility] Time Tracking (below) Total Timed Codes (min):   1:1 Treatment Time:    
TREATMENT AREA =  Pelvic and perineal pain [R10.2] SUBJECTIVE Pain Level (on 0 to 10 scale):  7.5  / 10 Medication Changes/New allergies or changes in medical history, any new surgeries or procedures? No If yes, update Summary List  
Subjective Functional Status/Changes:  []  No changes reported Functional improvement: received dilator set Functional limitations: still on period, unable to do any internal therapy today. Spasms following orgasm are debilitatingly painful. OBJECTIVE Modalities Rationale:     decrease pain and increase tissue extensibility to improve patient's ability to ambulate, perform ADLs 
 min [] Estim, type/location:   
                                 []  att     []  unatt     []  w/US     []  w/ice    []  w/heat 
 min []  Mechanical Traction: type/lbs   
               []  pro   []  sup   []  int   []  cont    []  before manual    []  after manual  
 min []  Ultrasound, settings/location:    
 min []  Iontophoresis w/ dexamethasone, location:   
                                           []  take home patch       []  in clinic  
(15 during tx) min []  Ice     [x]  Heat    location/position: Supine with pad to lower pelvis  
 min []  Vasopneumatic Device, press/temp:   
 min []  Other:   
[] Skin assessment post-treatment (if applicable):   
[]  intact    []  redness- no adverse reaction    
[]redness  adverse reaction:     
25 min Therapeutic Exercise:  [x] See flow sheet : 
[]  Pelvic floor strengthening                [x]  Pelvic floor downtraining []  Quality pelvic floor contractions      [x]  Relaxation techniques []  Urge suppression exercises 
[x]  Other: restorative yoga poses Rationale:      increase ROM, improve coordination and increase proprioception to improve the patients ability to ambulate, perform ADLs 15 min Manual Therapy: Manual TPR to R adductor and quad Rationale:      decrease pain, increase ROM, increase tissue extensibility, decrease trigger points and increase postural awareness to improve patient's ability to [id number] min Therapeutic Activity: [x]  See flow sheet 
[] Bladder Training: [x] voiding schedule 
[x] program progression Rationale:    increase ROM, improve coordination and increase proprioception to improve the patients ability to perform ADLs Billed With/As: 
 [x] TE 
 [x] TA 
 [] Neuro 
 [] Self Care Patient Education: [x] Review HEP [] Progressed/Changed HEP based on:  
[] positioning   [] body mechanics   [] transfers   [] heat/ice application   
[] other:   
Other Objective/Functional Measures: 
 
Tender to superficial palpation of R adductors and quad, TrPs present t/o Hypermobility of B hips evident in happy baby pose Able to perform all restorative yoga poses except sphinx and thoracic stretch without pain Post Treatment Pain Level (on 0 to 10) scale:   5  / 10 ASSESSMENT Assessment/Changes in Function:  
 
Still experiencing significant pain due to chronic IBS and IC Unable to make last 3 visits due to traveling and sickness 
  
[]  See Progress Note/Recertification Patient will continue to benefit from skilled PT services to modify and progress therapeutic interventions, address functional mobility deficits, address ROM deficits, address strength deficits, analyze and address soft tissue restrictions, analyze and cue movement patterns, analyze and modify body mechanics/ergonomics, assess and modify postural abnormalities and instruct in home and community integration to attain remaining goals. Progress toward goals / Updated goals: 
Establish specific HEP strategies for pain relief, stretching, strengthening Decrease resting PF mm tone via downtraining strategies so patient is able to strengthen PF mm to decrease OCTAVIO. PLAN [x]  Upgrade activities as tolerated yes Continue plan of care  
[]  Discharge due to :   
[]  Other:   
 
Therapist: Alondra Hendrickson, PT, DPT, MTC, CMTPT Date: 3/19/2019 Time: 9:42 AM  
 
Future Appointments Date Time Provider Louie Peng 3/21/2019 11:30 AM Maggie Hurt, PT REH[facility] AT [facility]  
3/26/2019  9:30 AM Cesia Fermin, PT [facility] AT [facility]  
3/28/2019 11:30 AM Maggie Hurt, PT [facility] AT [facility]

## 2019-03-21 ENCOUNTER — APPOINTMENT (OUTPATIENT)
Dept: PHYSICAL THERAPY | Age: 34
End: 2019-03-21
Payer: OTHER GOVERNMENT

## 2019-03-26 ENCOUNTER — APPOINTMENT (OUTPATIENT)
Dept: PHYSICAL THERAPY | Age: 34
End: 2019-03-26
Payer: OTHER GOVERNMENT

## 2019-03-28 ENCOUNTER — HOSPITAL ENCOUNTER (OUTPATIENT)
Dept: PHYSICAL THERAPY | Age: 34
Discharge: HOME | End: 2019-03-28
Payer: OTHER GOVERNMENT

## 2019-03-28 PROCEDURE — 97140 MANUAL THERAPY 1/> REGIONS: CPT | Performed by: PHYSICAL THERAPIST

## 2019-03-28 PROCEDURE — 97110 THERAPEUTIC EXERCISES: CPT | Performed by: PHYSICAL THERAPIST

## 2019-03-28 NOTE — PROGRESS NOTES
PHYSICAL THERAPY - DAILY TREATMENT NOTE Patient Name: Mera Farias        Date: 3/28/2019 : 1985   YES Patient  Verified Visit #:   15   of   24  Insurance: Payor: ANTONETTE / Plan: Greg Hooper 74 / Product Type: [place] / In time: 1135 Out time: 1201 Total Treatment Time: 34 Medicare/[facility] Time Tracking (below) Total Timed Codes (min):   1:1 Treatment Time:    
TREATMENT AREA =  Pelvic and perineal pain [R10.2] SUBJECTIVE Pain Level (on 0 to 10 scale):  4  / 10 Medication Changes/New allergies or changes in medical history, any new surgeries or procedures? NO    If yes, update Summary List  
Subjective Functional Status/Changes:  []  No changes reported Functional improvements: less pain with intercourse Functional impairments: pain through pelvis, tolerates minimal activity prior to pain occurring. OBJECTIVE 15 min Therapeutic Exercise:  [x]  See flow sheet Rationale:      increase ROM and increase strength to improve the patients ability to  improve patient's ability to perform ADL's with decreased pain 19 min Manual Therapy: Technique:     
[x] S/DTM []IASTM []PROM [x] Passive Stretching  
[]manual TPR []Jt manipulation:Gr I [] II []  III [] IV[] V[] Treatment Area:  B hip adductor DTM and STM with stretching of adductors and piriformis. Visceral manipulation of R side of bladder Rationale:      decrease pain, increase ROM and increase tissue extensibility to improve patient's ability to  improve patient's ability to perform ADL's with decreased pain Billed With/As: 
 [x] TE 
 [] TA 
 [] Neuro 
 [] Self Care Patient Education: [x] Review HEP [] Progressed/Changed HEP based on:  
[] positioning   [] body mechanics   [] transfers   [] heat/ice application   
[] other:   
Other Objective/Functional Measures: Added TA draw which was tolerated x 7 reps. and SB x 5 reps Post Treatment Pain Level (on 0 to 10) scale:   7 / 10 ASSESSMENT Assessment/Changes in Function: The pt was unable to tolerate greater than 5 reps of exercises due to already high resting tone and triggering increased pain with minimal effort. Bladder significantly tight and gentle visceral mobilization of the organ increased pain, but this was also done after therapeutic exercise. []  See Progress Note/Recertification Patient will continue to benefit from skilled PT services to modify and progress therapeutic interventions, address functional mobility deficits, address ROM deficits, address strength deficits, analyze and address soft tissue restrictions, analyze and cue movement patterns, analyze and modify body mechanics/ergonomics, assess and modify postural abnormalities and address imbalance/dizziness to attain remaining goals. Progress toward goals / Updated goals: 
Establish specific HEP strategies for pain relief, stretching, strengthening Decrease resting PF mm tone via downtraining strategies so patient is able to strengthen PF mm to decrease OCTAVIO. PLAN [x]  Upgrade activities as tolerated YES Continue plan of care  
[]  Discharge due to :   
[]  Other:   
 
Therapist: Dayanna Marcus PT Date: 3/28/2019 Time: 11:51 AM  
No future appointments.

## 2019-04-02 ENCOUNTER — HOSPITAL ENCOUNTER (OUTPATIENT)
Dept: PHYSICAL THERAPY | Age: 34
Discharge: HOME | End: 2019-04-02
Payer: OTHER GOVERNMENT

## 2019-04-02 PROCEDURE — 97110 THERAPEUTIC EXERCISES: CPT

## 2019-04-02 PROCEDURE — 97140 MANUAL THERAPY 1/> REGIONS: CPT

## 2019-04-02 NOTE — PROGRESS NOTES
PELVIC FLOOR DAILY TREATMENT NOTE 1- Patient Name: Alexis Dior        Date: 2019 : 1985   YES  Patient  Verified Visit #:     Insurance: Payor:  / Plan: Dario Rosas / Product Type: Stratton Sinning / In time: 230 Out time: 315 Total Treatment Time: 45 Medicare/[facility] Time Tracking (below) Total Timed Codes (min):   1:1 Treatment Time:    
TREATMENT AREA =  Pelvic and perineal pain [R10.2] SUBJECTIVE Pain Level (on 0 to 10 scale):  8  / 10 Medication Changes/New allergies or changes in medical history, any new surgeries or procedures? No If yes, update Summary List  
Subjective Functional Status/Changes:  []  No changes reported Functional improvement: peeing is better, I am able to sit down and freely void, without pushing out or anything Functional limitations: vaginal pain and spasms OBJECTIVE Modalities Rationale:     decrease pain to improve patient's ability to perform ADLs 
 min [] Estim, type/location:   
                                 []  att     []  unatt     []  w/US     []  w/ice    []  w/heat 
 min []  Mechanical Traction: type/lbs   
               []  pro   []  sup   []  int   []  cont    []  before manual    []  after manual  
 min []  Ultrasound, settings/location:    
 min []  Iontophoresis w/ dexamethasone, location:   
                                           []  take home patch       []  in clinic  
20 (during manual) min []  Ice     [x]  Heat    location/position: In supine to low abdominal area  
 min []  Vasopneumatic Device, press/temp:   
 min []  Other:   
[x] Skin assessment post-treatment (if applicable):   
[x]  intact    [x]  redness- no adverse reaction    
[]redness  adverse reaction:     
10 min Therapeutic Exercise:  [x] See flow sheet : 
[]  Pelvic floor strengthening                [x]  Pelvic floor downtraining 
[]  Quality pelvic floor contractions      [x]  Relaxation techniques []  Urge suppression exercises 
[x]  Other: hip stretching Rationale:      increase ROM and increase proprioception to improve the patients ability to perform ADLS  
35 min Manual Therapy: STM/DTM/manual TPR to B adductors in supine Rationale:      decrease pain, increase ROM, increase tissue extensibility and decrease trigger points to improve patient's ability to perform ADLs Billed With/As: 
 [x] TE 
 [] TA 
 [] Neuro 
 [] Self Care Patient Education: [x] Review HEP [] Progressed/Changed HEP based on:  
[] positioning   [] body mechanics   [] transfers   [] heat/ice application   
[] other:   
Other Objective/Functional Measures: 
 
Increased tone and tenderness to B adductors, improved post manual treatment Patient received dilators in the mail, we walked through how to use them for downtraining and relaxation along with restorative yoga poses for home Post Treatment Pain Level (on 0 to 10) scale:   5  / 10 ASSESSMENT Assessment/Changes in Function:  
 
Improvement in voiding ease, but still having significant pain from spasms 
  
[]  See Progress Note/Recertification Patient will continue to benefit from skilled PT services to modify and progress therapeutic interventions, address ROM deficits, address strength deficits, analyze and address soft tissue restrictions, analyze and cue movement patterns, analyze and modify body mechanics/ergonomics, assess and modify postural abnormalities and instruct in home and community integration to attain remaining goals. Progress toward goals / Updated goals: 
1) Patient independent in HEP.   
2) Patient will report > or = 50% improvement in pain with sitting, walking, intercourse. 3) Decrease score on FOTO/Pelvic Pain to < or = 57 to indicate improved quality of life.   
4) Patient to demo WNL resting tone of PF mm for relaxed voiding and painfree ADLs. *To be formally reassessed at 4/9/19 visit.   
 
PLAN 
 [x]  Upgrade activities as tolerated yes Continue plan of care  
[]  Discharge due to :   
[]  Other:   
 
Therapist: Reza Srinivasan, PT, DPT, MTC, CMTPT Date: 4/2/2019 Time: 2:32 PM  
 
Future Appointments Date Time Provider Louie Peng 4/5/2019 12:30 PM Chace Hurt, PT [facility] AT [facility]  
4/9/2019  3:15 PM Bay Han, PT RE[facility] AT [facility]  
4/11/2019 12:30 PM Chace Hurt, PT [facility] AT [facility]  
4/16/2019  9:30 AM Bay Han PT [facility] AT [facility]  
4/23/2019  9:30 AM Bay Han PT [facility]  
4/25/2019 12:30 PM Chace Hurt PT [facility] AT [facility]  
4/30/2019  9:30 AM Bay Han PT [facility] AT [facility]  
5/2/2019 10:30 AM Chace Hurt, PT [facility] AT [facility]

## 2019-04-05 ENCOUNTER — HOSPITAL ENCOUNTER (OUTPATIENT)
Dept: PHYSICAL THERAPY | Age: 34
Discharge: HOME | End: 2019-04-05
Payer: OTHER GOVERNMENT

## 2019-04-05 PROCEDURE — 97110 THERAPEUTIC EXERCISES: CPT | Performed by: PHYSICAL THERAPIST

## 2019-04-05 PROCEDURE — 97140 MANUAL THERAPY 1/> REGIONS: CPT | Performed by: PHYSICAL THERAPIST

## 2019-04-05 NOTE — PROGRESS NOTES
PHYSICAL THERAPY - DAILY TREATMENT NOTE Patient Name: Ashanti Moy        Date: 2019 : 1985   YES Patient  Verified Visit #:   16      24  Insurance: Payor: ANTONETTE / Plan: Ruddy Reveles / Product Type: Martha Maddox / In time: 1141 Out time: 1220 Total Treatment Time: 44 Medicare/[facility] Time Tracking (below) Total Timed Codes (min):   1:1 Treatment Time:    
TREATMENT AREA =  Pelvic and perineal pain [R10.2] SUBJECTIVE Pain Level (on 0 to 10 scale):  5  / 10 Medication Changes/New allergies or changes in medical history, any new surgeries or procedures? NO    If yes, update Summary List  
Subjective Functional Status/Changes:  []  No changes reported Reports having much less LBP and is able to walk better. Tried to do a little Yoga and tolerates small amounts before pain kicks in. OBJECTIVE 8 min Therapeutic Exercise:  [x]  See flow sheet Rationale:      increase ROM and increase strength to improve the patients ability to release hip and pelvic pain. 31 min Manual Therapy: Technique:     
[x] S/DTM []IASTM []PROM [] Passive Stretching  
[x]manual TPR []Jt manipulation:Gr I [] II []  III [] IV[] V[] Treatment Area:  Pelvic floor release, mild TPR to B glute med, adductors and lateral quads Rationale:      decrease pain, increase ROM and increase tissue extensibility to improve patient's ability to relieve hip/back and abdominal pain. Billed With/As: 
 [x] TE 
 [] TA 
 [] Neuro 
 [] Self Care Patient Education: [x] Review HEP [] Progressed/Changed HEP based on:  
[] positioning   [] body mechanics   [] transfers   [] heat/ice application   
[] other:   
Other Objective/Functional Measures: 
 
Tightness to lateral quads with tenderness and R adductor Post Treatment Pain Level (on 0 to 10) scale:   3  / 10 ASSESSMENT Assessment/Changes in Function: The pt had less pain post therapy with good release to the L adductor and lower abdominal/pelvic floor. She was able to tolerate little restorative Yoga without flaring symptoms. []  See Progress Note/Recertification Patient will continue to benefit from skilled PT services to modify and progress therapeutic interventions, address functional mobility deficits, address ROM deficits, address strength deficits, analyze and address soft tissue restrictions, analyze and cue movement patterns and analyze and modify body mechanics/ergonomics to attain remaining goals. Progress toward goals / Updated goals: 
 improve patient's ability to perform ADL's with decreased pain PLAN [x]  Upgrade activities as tolerated YES Continue plan of care  
[]  Discharge due to :   
[]  Other:   
 
Therapist: Yariel Miller PT Date: 4/5/2019 Time: 12:02 PM  
 
Future Appointments Date Time Provider Louie Peng 4/9/2019  3:15 PM Aristeo Olivares, PT REH[facility] AT [facility]  
4/11/2019 12:30 PM Jayy Hurt, PT REH[facility] AT [facility]  
4/16/2019  9:30 AM Aristeo Olivares, PT RE[facility] AT [facility]  
4/23/2019  9:30 AM Aristeo Olivares, PT RE[facility] AT [facility]  
4/25/2019 12:30 PM Pinky Vasquez, PT REH[facility] AT [facility]  
4/30/2019  9:30 AM Aristeo Olivares, PT RE[facility] AT [facility]  
5/2/2019 10:30 AM Jayy Hurt, PT REH[facility] AT [facility]

## 2019-04-09 ENCOUNTER — APPOINTMENT (OUTPATIENT)
Dept: PHYSICAL THERAPY | Age: 34
End: 2019-04-09
Payer: OTHER GOVERNMENT

## 2019-04-11 ENCOUNTER — HOSPITAL ENCOUNTER (OUTPATIENT)
Dept: PHYSICAL THERAPY | Age: 34
Discharge: HOME | End: 2019-04-11
Payer: OTHER GOVERNMENT

## 2019-04-11 PROCEDURE — 97140 MANUAL THERAPY 1/> REGIONS: CPT | Performed by: PHYSICAL THERAPIST

## 2019-04-11 NOTE — PROGRESS NOTES
PHYSICAL THERAPY - DAILY TREATMENT NOTE Patient Name: Mera Farias        Date: 2019 : 1985   YES Patient  Verified Visit #:      of   24  Insurance: Payor: ANTONETTE / Plan: Kevin Krill / Product Type: [address] [place] [address] Avenue / In time: 1238 Out time: 118 Total Treatment Time: 40 Medicare/[facility] Time Tracking (below) Total Timed Codes (min):  40 1:1 Treatment Time:  40 TREATMENT AREA =  Pelvic and perineal pain [R10.2] SUBJECTIVE Pain Level (on 0 to 10 scale):  5  / 10 Medication Changes/New allergies or changes in medical history, any new surgeries or procedures? NO    If yes, update Summary List  
Subjective Functional Status/Changes:  []  No changes reported LBP due to issues at home with animals, cleaning due to pet accident. Climbed stairs with ladder to remove curtain due to animal urination on it and removed heavy curtain, cleaned floor muliple times. Reported that she was pleased that she was able to do all that work. OBJECTIVE 4 minutes min Therapeutic Exercise:  [x]  See flow sheet Rationale:      increase ROM and increase strength to improve the patients ability to  improve patient's ability to perform ADL's with decreased pain 36 min Manual Therapy: Technique:     
S/DTM to lumbar paraspinals with pt in rodríguez' pose and TPR to glute med, piriformis:   STM with TPR to B adductors followed by stretch to adductors, HS, and lumbar. Rationale:      decrease pain, increase ROM and increase tissue extensibility to improve patient's ability to  improve patient's ability to perform ADL's with decreased pain Billed With/As: 
 [] TE 
 [] TA 
 [] Neuro 
 [] Self Care Patient Education: [x] Review HEP [] Progressed/Changed HEP based on:  
[] positioning   [] body mechanics   [] transfers   [] heat/ice application   
[x] other:  Discussed importance of proper water intake to promote better ms function and proper nutrition. Other Objective/Functional Measures: 
 
Tight/tender to L4-5 B, L glute med and R piriformis, B adductors. Antalgic ambulation in to therapy this date. Post Treatment Pain Level (on 0 to 10) scale:   2  / 10 ASSESSMENT Assessment/Changes in Function:  
 
Functional tolerance:Improved ability to ambulate post therapy due to decreased ms spasms. Functional impairments: pain to vaginal area and lumbar spine with increased activity and anxiety. []  See Progress Note/Recertification Patient will continue to benefit from skilled PT services to modify and progress therapeutic interventions, address functional mobility deficits, address ROM deficits, address strength deficits, analyze and address soft tissue restrictions, analyze and cue movement patterns, analyze and modify body mechanics/ergonomics, assess and modify postural abnormalities and address imbalance/dizziness to attain remaining goals. Progress toward goals / Updated goals: 
Slow progress, but improved ability to tolerate more activity. PLAN [x]  Upgrade activities as tolerated YES Continue plan of care  
[]  Discharge due to :   
[]  Other:   
 
Therapist: Sania Arboleda PT Date: 4/11/2019 Time: 12:39 PM  
 
Future Appointments Date Time Provider Louie Peng 4/16/2019  9:30 AM Walt Brito, PT [facility]  
4/23/2019  9:30 AM Walt Brito, PT [facility]  
4/25/2019 12:30 PM Teresiat Hurt, PT [facility]  
4/30/2019  9:30 AM Walt Brito, PT [facility]  
5/2/2019 10:30 AM Teresita Hurt, PT [facility] AT [facility]

## 2019-04-16 ENCOUNTER — HOSPITAL ENCOUNTER (OUTPATIENT)
Dept: PHYSICAL THERAPY | Age: 34
Discharge: HOME | End: 2019-04-16
Payer: OTHER GOVERNMENT

## 2019-04-16 PROCEDURE — 97140 MANUAL THERAPY 1/> REGIONS: CPT

## 2019-04-16 PROCEDURE — 97110 THERAPEUTIC EXERCISES: CPT

## 2019-04-16 NOTE — PROGRESS NOTES
PELVIC FLOOR DAILY TREATMENT NOTE - Patient Name: Janice Elizondo        Date: 2019 : 1985   YES  Patient  Verified Visit #:     Insurance: Payor: ANTONETTE / Plan: Greg Hooper [address] / Product Type: [address] / In time: 935 Out time: 1023 Total Treatment Time: 52 Medicare/[facility] Time Tracking (below) Total Timed Codes (min):   1:1 Treatment Time:    
TREATMENT AREA =  Pelvic and perineal pain [R10.2] SUBJECTIVE Pain Level (on 0 to 10 scale):  4  / 10 Medication Changes/New allergies or changes in medical history, any new surgeries or procedures? No If yes, update Summary List  
Subjective Functional Status/Changes:  []  No changes reported Functional improvement: driving more, able to tolerate it with the heating element on 
Functional limitations: increased pain and spasm over the last week due to increased responsibility and activity while  is gone; unable to use the dilators at all because of the increased pain and spasm OBJECTIVE 15 min Therapeutic Exercise:  [x] See flow sheet : 
[]  Pelvic floor strengthening                [x]  Pelvic floor downtraining 
[]  Quality pelvic floor contractions      [x]  Relaxation techniques []  Urge suppression exercises 
[x]  Other: restorative yoga positioning for relaxation Rationale:      increase ROM and increase proprioception to improve the patients ability to tolerate ADLs 32 min Manual Therapy: Internal TPR to b OI, LA mm; external piriformis release B in prone, sacral scrubbing B, manual TPR to hip rotators and glues Rationale:      decrease pain, increase ROM, increase tissue extensibility, decrease trigger points and increase postural awareness to improve patient's ability to tolerate ADLs Billed With/As: 
 [x] TE 
 [] TA 
 [] Neuro 
 [] Self Care Patient Education: [x] Review HEP [] Progressed/Changed HEP based on: [] positioning   [] body mechanics   [] transfers   [] heat/ice application   
[] other:   
Other Objective/Functional Measures: 
 
Significant TTP of R adductors, B obturator internus, B piriformis (R>L) Palpation of L OI produces L hip pain Post Treatment Pain Level (on 0 to 10) scale:   4  / 10 ASSESSMENT Assessment/Changes in Function:  
 
Patient tolerance to manual therapy is defintely increasing, she was able to walk out of treatment today without increased pain nor limp/change in gait 
  
[]  See Progress Note/Recertification Patient will continue to benefit from skilled PT services to modify and progress therapeutic interventions, address functional mobility deficits, address ROM deficits, address strength deficits, analyze and address soft tissue restrictions, analyze and cue movement patterns, analyze and modify body mechanics/ergonomics, assess and modify postural abnormalities and instruct in home and community integration to attain remaining goals. Progress toward goals / Updated goals: Working towards lessening pain with ADLs for driving, sitting, standing tolerance PLAN [x]  Upgrade activities as tolerated yes Continue plan of care  
[]  Discharge due to :   
[]  Other:   
 
Therapist: Caryle Must, PT, DPT, MTC, CMTPT Date: 4/16/2019 Time: 9:34 AM  
 
Future Appointments Date Time Provider Louie Peng 4/23/2019  9:30 AM Tamera Yoon PT [facility]  
4/25/2019 12:30 PM Tamie Hurt PT [facility] AT [facility]  
4/30/2019  9:30 AM Tamera Yoon PT [facility]  
5/2/2019 10:30 AM Tamie Hurt PT [facility] AT [facility]

## 2019-04-23 ENCOUNTER — HOSPITAL ENCOUNTER (OUTPATIENT)
Dept: PHYSICAL THERAPY | Age: 34
Discharge: HOME | End: 2019-04-23
Payer: OTHER GOVERNMENT

## 2019-04-23 PROCEDURE — 97530 THERAPEUTIC ACTIVITIES: CPT

## 2019-04-23 PROCEDURE — 97112 NEUROMUSCULAR REEDUCATION: CPT

## 2019-04-23 NOTE — PROGRESS NOTES
Jack Perez [address]  [facility] PHYSICAL THERAPY AT [address] [facility], [address], [place] [address] - Phone: [phone]  Fax: [phone] PROGRESS NOTE Patient Name: Anjum Barnes : 1985 Treatment/Medical Diagnosis: Pelvic and perineal pain [R10.2] Referral Source: Gurpreet Rojas * Date of Initial Visit: 18 Attended Visits: 20 Missed Visits: 6 SUMMARY OF TREATMENT: 
initial evaluation, therapeutic activity, therapeutic exercise, manual therapy, neuromuscular re-education, patient education CURRENT STATUS: 
Patient has been steadily progressing with skilled PT services. She consistently demonstrates WNL gait pattern and is having much higher tolerance for ADLs, able to walk or practice yoga daily, and increase her household activities. Pelvic pain is consistently < or = 4/10. Because of increased activity, however, she is having increased LBP. Stress and anxiety are a constant factor for the patient as her  deploys regularly and it increases her responsibilities at home. We have discussed in detail stress management techniques and the patient has done well with breath control and grading her activity. At this time, we are focusing on continuing to decrease tone of the pelvic floor mm at rest and improving voiding habits. Previous Goals: 
1) Patient independent in HEP.   
2) Patient will report > or = 50% improvement in pain with sitting, walking, intercourse.  Able to void with increased ease and had \"dribbles\". 3) Decrease score on FOTO/Pelvic Pain to < or = 57 to indicate improved quality of life.   
4) Patient to demo WNL resting tone of PF mm for relaxed voiding and painfree ADLs. Prior Level/Current Level: 
1) Prior Level: compliant Current Level: compliant, >50% independent Goal Met? progressing 2) Prior Level: similar pain level  Current Level: decreasing pain level, patient unable to quantify, but she feels more confident in controlling pain level via HEP instructions Goal Met? progressing 3) Prior Level: 67 
 Current Level: NA--not formally reassessed at patient's request 
 Goal Met? n/a 
4) Prior Level: Significantly elevated tone via biofeedback (consistently >20mV0 Current Level: resting tone on average 10mV, least tone evident in R s/l 
 Goal Met? progressing New Goals to be achieved in __6__  weeks: 
1) Patient independent in HEP.   
2) Patient will report <4/10 pain with all ADLs, including intercourse.  Able to void with ease and without leakage. 3) Decrease score on FOTO/Pelvic Pain to < or = 57 to indicate improved quality of life.   
4) Patient to demo WNL resting tone of PF mm for relaxed voiding and painfree ADLs. RECOMMENDATIONS Continue skilled PT services as above 1-2x6 weeks for further progression towards all set goals. If you have any questions/comments please contact us directly at [phone]. Thank you for allowing us to assist in the care of your patient. Therapist Signature: Laura Duenas, PT, DPT, MTC, CMTPT Date: 4/23/2019 Time: 9:39 AM  
NOTE TO PHYSICIAN:  PLEASE COMPLETE THE ORDERS BELOW AND FAX TO [facility] at [place]: [phone]. If you are unable to process this request in 24 hours please contact our office: [phone]. 
 
___ I have read the above report and request that my patient continue as recommended.  
___ I have read the above report and request that my patient continue therapy with the following changes/special instructions:_________________________________________________________  
___ I have read the above report and request that my patient be discharged from therapy.   
 
Physician Signature:       Date:      Time:

## 2019-04-23 NOTE — PROGRESS NOTES
PF DAILY TREATMENT NOTE 3-16 Patient Name: Brandi Phelps Date:2019 : 1985 [x]  Patient  Verified Payor: ANTONETTE / Plan: Gael Bell / Product Type: Anjelica Lennon / In time:930  Out time:1015 Total Treatment Time (min): 45 Visit #: 20 of 24 Medicare/[facility] Only Total Timed Codes (min):   1:1 Treatment Time:    
 
Treatment Area: [x] Pelvic Floor     [] Other: SUBJECTIVE Pain Level (0-10 scale): 4 pelvis, 6-7 lower back Any medication changes, allergies to medications, adverse drug reactions, diagnosis change, or new procedure performed?: [x] No    [] Yes (see summary sheet for update) Subjective functional status/changes:   [] No changes reported Less pelvic pain /\"vaginismus\" when having intercourse regularly Significantly increased stress lately has had a negative effect on voiding (found herself pushing out again to void), and with pain (increased) OBJECTIVE 15 min Therapeutic Activity:  [x]  See flow sheet :  
 []  Increase Tissue extensibility        []  Assess fiber intake [x]  Assess voiding habits  []  Assess bowel habits [x]  Other: relaxation strategies Rationale: increase ROM and increase proprioception  to improve the patients ability to perform ADLs without pain 30 min Neuromuscular Re-education:  [x]  See flow sheet :via use of bioefeedback in multiple positions []  Pelvic floor strengthening                 [x]  Pelvic floor downtraining 
[]  Quality pelvic floor contractions       []  Relaxation techniques []  Urge suppression exercises 
[]  Other: 
Rationale: increase ROM, increase strength, improve coordination and increase proprioception  to improve the patients ability to perform ADLs without pain With 
 [] TE 
 [x] TA [x] neuro 
[] manual 
 [] other: Patient Education: [x] Review HEP [] Progressed/Changed HEP based on:  
[] positioning   [] body mechanics   [] transfers   [] heat/ice application   
[] other: Other Objective/Functional Measures:  
[x]baseline resting tone: supine 20mV, R s/l 5mV 
[]slow twitch mms [x]fast twitch mms 100mV plus with rolling supine-s/l, up to 20 with laughing and with subsequent rolling Pain Level (0-10 scale) post treatment: 4 
 
ASSESSMENT/Changes in Function: anxiety and stress at home definitely a factor. Mentioned independence over the next 4-6 weeks and patient had a small panic attack about being independent from PT. She was firmly reassured that therapy is not ending, we're just planning ahead to when she can be independent. Referred her to [facility] For yoga throughout the week. [x]  Decrease # of leaks 
 [] No change [x]  Improving [] Resolved 
  
[]  Decrease hypertonus [] No change []  Improving [] Resolved 
  
[]  Increase void interval [] No change []  Improving [] Resolved 
  
[]  Increase PF strength [] No change []  Improving [] Resolved 
  
[]  Increase PF endurance [] No change []  Improving [] Resolved 
  
[]  Increase endurance [] No change []  Improving [] Resolved 
  
[]  Decrease # of pads [] No change []  Improving [] Resolved [x]  Decrease pain [] No change [x]  Improving [] Resolved [x]  Increased coordination [] No change [x]  Improving [] Resolved 
  
[]  Increased Bowel Frequency [] No change []  Improving [] Resolved Patient will continue to benefit from skilled PT services to modify and progress therapeutic interventions, address functional mobility deficits, address ROM deficits, address strength deficits, analyze and address soft tissue restrictions, analyze and cue movement patterns, analyze and modify body mechanics/ergonomics, assess and modify postural abnormalities and instruct in home and community integration to attain remaining goals. []  See Plan of Care [x]  See progress note/recertification 
[]  See Discharge Summary Progress towards goals / Updated goals: 
See PN 
 
PLAN 
 [x]  Upgrade activities as tolerated     [x]  Continue plan of care 
[]  Update interventions per flow sheet      
[]  Discharge due to:_ 
[]  Other:_   
 
Vianey Schmid, PT 4/23/2019  9:38 AM 
 
Future Appointments Date Time Provider Louie Peng 4/25/2019 12:30 PM Blanca Hurt, PT [facility] AT [facility]  
4/30/2019  9:30 AM Marky Ayers, PT [facility] AT [facility]  
5/2/2019 10:30 AM Blanca Hurt, PT [facility] AT [facility]

## 2019-04-25 ENCOUNTER — APPOINTMENT (OUTPATIENT)
Dept: PHYSICAL THERAPY | Age: 34
End: 2019-04-25
Payer: OTHER GOVERNMENT

## 2019-04-30 ENCOUNTER — APPOINTMENT (OUTPATIENT)
Dept: PHYSICAL THERAPY | Age: 34
End: 2019-04-30
Payer: OTHER GOVERNMENT

## 2019-05-02 ENCOUNTER — HOSPITAL ENCOUNTER (OUTPATIENT)
Dept: PHYSICAL THERAPY | Age: 34
Discharge: HOME | End: 2019-05-02
Payer: OTHER GOVERNMENT

## 2019-05-02 PROCEDURE — 97140 MANUAL THERAPY 1/> REGIONS: CPT | Performed by: PHYSICAL THERAPIST

## 2019-05-02 NOTE — PROGRESS NOTES
PHYSICAL THERAPY - DAILY TREATMENT NOTE Patient Name: Gracy Ganser        Date: 2019 : 1985   YES Patient  Verified Visit #:   21 (1)   of   24 Insurance: Payor: ANTONETTE / Plan: Joleen Francis / Product Type: Irvin Rosita / In time: 1040 Out time: 1115 Total Treatment Time: 35 Medicare/[facility] Time Tracking (below) Total Timed Codes (min):   1:1 Treatment Time:    
TREATMENT AREA =  Pelvic and perineal pain [R10.2] SUBJECTIVE Pain Level (on 0 to 10 scale):  7-8  / 10 Medication Changes/New allergies or changes in medical history, any new surgeries or procedures? NO    If yes, update Summary List  
Subjective Functional Status/Changes:  []  No changes reported The pt reports that she's trying to do Yoga and is able to push through this pain if she is aware that it's for her back. Has increased back pain due to having a panic attack regarding [employer] benefits. OBJECTIVE 35 min Manual Therapy: Technique:     
[x] S/DTM []IASTM []PROM [] Passive Stretching  
[]manual TPR []Jt manipulation:Gr I [] II []  III [] IV[] V[] Treatment Area  MFR to the lumbar paraspinals, DTM to L5/S1 while pt was in child's pose. Rationale:      decrease pain, increase ROM and increase tissue extensibility to improve patient's ability to  improve patient's ability to perform ADL's with decreased pain Billed With/As: 
 [] TE 
 [] TA 
 [] Neuro 
 [] Self Care Patient Education: [x] Review HEP [] Progressed/Changed HEP based on:  
[] positioning   [] body mechanics   [] transfers   [] heat/ice application   
[] other:   
Other Objective/Functional Measures: 
 
Tender to the R of L5/S1 Post Treatment Pain Level (on 0 to 10) scale:   4-5  / 10 ASSESSMENT Assessment/Changes in Function:  
 
Decrease in fascia restriction following manual. 
  
[]  See Progress Note/Recertification Patient will continue to benefit from skilled PT services to modify and progress therapeutic interventions, address functional mobility deficits, address ROM deficits, address strength deficits, analyze and address soft tissue restrictions, analyze and cue movement patterns, analyze and modify body mechanics/ergonomics, assess and modify postural abnormalities and address imbalance/dizziness to attain remaining goals. Progress toward goals / Updated goals: Working towards lessening pain with ADLs for driving, sitting, standing tolerance PLAN [x]  Upgrade activities as tolerated YES Continue plan of care  
[]  Discharge due to :   
[]  Other:   
 
Therapist: Nimisha Pickett PT Date: 5/2/2019 Time: 11:15 AM  
 
Future Appointments Date Time Provider Louie Peng 5/7/2019  2:00 PM Bigg Coe MD [address]

## 2019-05-14 ENCOUNTER — APPOINTMENT (OUTPATIENT)
Dept: PHYSICAL THERAPY | Age: 34
End: 2019-05-14
Payer: OTHER GOVERNMENT

## 2019-05-21 ENCOUNTER — APPOINTMENT (OUTPATIENT)
Dept: PHYSICAL THERAPY | Age: 34
End: 2019-05-21
Payer: OTHER GOVERNMENT

## 2019-05-30 ENCOUNTER — APPOINTMENT (OUTPATIENT)
Dept: PHYSICAL THERAPY | Age: 34
End: 2019-05-30
Payer: OTHER GOVERNMENT

## 2019-06-04 ENCOUNTER — HOSPITAL ENCOUNTER (OUTPATIENT)
Dept: PHYSICAL THERAPY | Age: 34
Discharge: HOME | End: 2019-06-04
Payer: OTHER GOVERNMENT

## 2019-06-04 PROCEDURE — 97530 THERAPEUTIC ACTIVITIES: CPT

## 2019-06-04 PROCEDURE — 97140 MANUAL THERAPY 1/> REGIONS: CPT

## 2019-06-04 NOTE — PROGRESS NOTES
Jack Perez [address]  [facility] PHYSICAL THERAPY AT [address] [facility], [address], [place] - Phone: [phone]  Fax: [phone]  PROGRESS NOTE  Patient Name: Valentina Trejo : 1985   Treatment/Medical Diagnosis: Pelvic and perineal pain [R10.2]   Referral Source: Brigida White *     Date of Initial Visit: 18 Attended Visits: 22 Missed Visits: 8     SUMMARY OF TREATMENT:  initial evaluation, therapeutic activity, therapeutic exercise, manual therapy, neuromuscular re-education, patient education    CURRENT STATUS:  The patient has been seen one time since last PN due to scheduling conflicts and patient's family emergencies. She presents today with 4/10 pain while menstruating. She states it was much better until her cycle started. She has been managing lower abdominal pelvic pain at home with use of yoga and stretching exercises. She also reports some improvements with vaginismus and attributes that to having more frequent intercourse. She does still report some issues with bladder management and urinary control as well as LBP. Patient deferred formal reassessment of pelvic floor resting tone and FOTO today due to emotional stress she has been under due to deaths of family member and a friend. She will participate at next appt. Previous Goals:  1) Patient independent in HEP.    2) Patient will report <4/10 pain with all ADLs, including intercourse.  Able to void with ease and without leakage. 3) Decrease score on FOTO/Pelvic Pain to < or = 57 to indicate improved quality of life.    4) Patient to demo WNL resting tone of PF mm for relaxed voiding and painfree ADLs.      Prior Level/Current Level:  1) Prior Level: compliant, >50% independent   Current Level: patient is independent with HEP   Goal Met? yes  2) Prior Level: decreasing pain level, patient unable to quantify, but she feels more confident in controlling pain level via HEP instructions   Current Level: patient continues to have < or = 4/10 pain   Goal Met? yes  3) Prior Level: 67   Current Level: not formally reassessed, patient deferred   Goal Met? n/a  4) Prior Level: resting tone on average 10mV, least tone evident in R s/l   Current Level: not formally reassessed   Goal Met? progressing       New Goals to be achieved in __6__  weeks:  1) Patient to be confident in self-management of symptoms via HEP and through patient education. 2) Patient will report <4/10 pain with all ADLs, including intercourse.  Able to void with ease and without leakage. 3) Decrease score on FOTO/Pelvic Pain to < or = 57 to indicate improved quality of life.    4) Patient to demo of PF mm < or = 6 mv for relaxed voiding and painfree ADLs. RECOMMENDATIONS  Continue skilled PT services as above 1-2x6 weeks for further progression towards all set goals. If you have any questions/comments please contact us directly at [phone]. Thank you for allowing us to assist in the care of your patient. Therapist Signature: Vianey Schmid, PT, DPT, MTC, CMTPT Date: 6/4/2019     Time: 10:30 AM   NOTE TO PHYSICIAN:  PLEASE COMPLETE THE ORDERS BELOW AND FAX TO   [facility] at [address]: [phone]. If you are unable to process this request in 24 hours please contact our office: [phone].    ___ I have read the above report and request that my patient continue as recommended.   ___ I have read the above report and request that my patient continue therapy with the following changes/special instructions:_________________________________________________________   ___ I have read the above report and request that my patient be discharged from therapy.      Physician Signature:        Date:       Time:

## 2019-06-06 ENCOUNTER — HOSPITAL ENCOUNTER (OUTPATIENT)
Dept: PHYSICAL THERAPY | Age: 34
Discharge: HOME | End: 2019-06-06
Payer: OTHER GOVERNMENT

## 2019-06-06 PROCEDURE — 97140 MANUAL THERAPY 1/> REGIONS: CPT | Performed by: PHYSICAL THERAPIST

## 2019-06-06 PROCEDURE — 97110 THERAPEUTIC EXERCISES: CPT | Performed by: PHYSICAL THERAPIST

## 2019-06-06 NOTE — PROGRESS NOTES
PHYSICAL THERAPY - DAILY TREATMENT NOTE     Patient Name: Alo Loco        Date: 2019  : 1985   YES Patient  Verified  Visit #:   10   of   12  Insurance: Payor: ANTONETTE / Plan: Greg Hooper 74 / Product Type:  /      In time: 10 Out time: 7759   Total Treatment Time: 38     Medicare/[facility] Time Tracking (below)   Total Timed Codes (min):   1:1 Treatment Time:       TREATMENT AREA =  Pelvic and perineal pain [R10.2]    SUBJECTIVE    Pain Level (on 0 to 10 scale):   10   Medication Changes/New allergies or changes in medical history, any new surgeries or procedures? NO    If yes, update Summary List   Subjective Functional Status/Changes:  []  No changes reported     Feels better, but back is painful. Increased tolerance for intercourse and has been tolerating increased frequency. OBJECTIVE  8 min Therapeutic Exercise:  [x]  See flow sheet   Rationale:      increase ROM and increase strength to improve the patients ability to  improve patient's ability to perform ADL's with decreased pain       30 min Manual Therapy: Technique:      Visceral mobilization L side of uterus, descending colon, sigmoid colon, L triangular ligament. Motility to balance uterus and descending colon     Rationale:      decrease pain, increase ROM and increase tissue extensibility to improve patient's ability to tolerate gentle housework ADLs        Billed With/As:   [] TE   [] TA   [] Neuro   [] Self Care Patient Education: [x] Review HEP    [] Progressed/Changed HEP based on:   [] positioning   [] body mechanics   [] transfers   [] heat/ice application    [] other:        Other Objective/Functional Measures:  Lower abdominal area tight  Note non-antalgic gait.        Post Treatment Pain Level (on 0 to 10) scale:   3  / 10     ASSESSMENT    Assessment/Changes in Function:     Improved gait speed, pelvic rotation due to improved tolerance for mobility with decreased restrictions and pain through pelvis and spine. []  See Progress Note/Recertification   Patient will continue to benefit from skilled PT services to modify and progress therapeutic interventions, address functional mobility deficits, address ROM deficits, address strength deficits, analyze and address soft tissue restrictions, analyze and cue movement patterns, analyze and modify body mechanics/ergonomics, assess and modify postural abnormalities and address imbalance/dizziness to attain remaining goals. Progress toward goals / Updated goals:    Likely increased FOTO score due to less pain.      PLAN    [x]  Upgrade activities as tolerated YES Continue plan of care   []  Discharge due to :    []  Other:      Therapist: Angelia Ecsobedo PT    Date: 6/6/2019 Time: 10:01 AM     Future Appointments   Date Time Provider Louie Peng   6/13/2019 10:30 AM Fanta Hurt, PT [facility]   6/20/2019 10:30 AM Fanta Hurt, PT [facility] AT [facility]   6/20/2019  3:00 PM Amanda Coe MD [address]

## 2019-06-13 ENCOUNTER — HOSPITAL ENCOUNTER (OUTPATIENT)
Dept: PHYSICAL THERAPY | Age: 34
Discharge: HOME | End: 2019-06-13
Payer: OTHER GOVERNMENT

## 2019-06-13 PROCEDURE — 97110 THERAPEUTIC EXERCISES: CPT | Performed by: PHYSICAL THERAPIST

## 2019-06-13 PROCEDURE — 97140 MANUAL THERAPY 1/> REGIONS: CPT | Performed by: PHYSICAL THERAPIST

## 2019-06-13 NOTE — PROGRESS NOTES
PHYSICAL THERAPY - DAILY TREATMENT NOTE     Patient Name: Neelima Bernal        Date: 2019  : 1985   YES Patient  Verified  Visit #:   47  (2) of   12  Insurance: Payor:  / Plan: Sujey Birch / Product Type:  /      In time: 138 Out time:    Total Treatment Time: 41     Medicare/[facility] Time Tracking (below)   Total Timed Codes (min):  41 1:1 Treatment Time:  41     TREATMENT AREA =  Pelvic and perineal pain [R10.2]    SUBJECTIVE    Pain Level (on 0 to 10 scale):  3  / 10 pelvis, back pain = 5-6/10   Medication Changes/New allergies or changes in medical history, any new surgeries or procedures? NO    If yes, update Summary List   Subjective Functional Status/Changes:  []  No changes reported     Hurt her finger when it pulled off nail. Vaginal spasms haven't been as bad and has tolerated significant increase in intercourse. Back pain is ongoing and would like more therapy for this. OBJECTIVE    11 min Therapeutic Exercise:  [x]  See flow sheet   Rationale:      increase ROM and increase strength to improve the patients ability to  improve patient's ability to perform ADL's with decreased pain       30 min Manual Therapy: Technique:      MET to correct R hip ant rotation, L on L sacral torsion, L   multisegmental lumbar rotation and L on R rotation T12/L1. DTM to L piriformis, glute, lumbar paraspinal, adductor   Rationale:      decrease pain, increase ROM and increase tissue extensibility to improve patient's ability to stand and walk        Billed With/As:   [] TE   [] TA   [] Neuro   [] Self Care Patient Education: - Review HEP    - Progressed/Changed HEP based on:   - positioning   - body mechanics   - transfers   - heat/ice application    - other:        Other Objective/Functional Measures:  (+) R hip innominate squish test.  R ASIS ant rotated - level post manual  L on L sacral rotation, level after SI realignemnt.   Trigger points L l/s, adductor, piriformis     Post Treatment Pain Level (on 0 to 10) scale:   4  / 10     ASSESSMENT    Assessment/Changes in Function:     Improved tolerance for therapy with  Pelvic realignment and exercise tolerance. The pt is also tolerating intercourse with less pain through pelvic region. The pt does have multiple trigger points through glutes and pelvis. []  See Progress Note/Recertification   Patient will continue to benefit from skilled PT services to modify and progress therapeutic interventions, address functional mobility deficits, address ROM deficits, address strength deficits, analyze and address soft tissue restrictions, analyze and cue movement patterns, analyze and modify body mechanics/ergonomics, assess and modify postural abnormalities and address imbalance/dizziness to attain remaining goals. Progress toward goals / Updated goals:    Pain <4/10 this date and increased ADL tolerance.      PLAN    [x]  Upgrade activities as tolerated YES Continue plan of care   []  Discharge due to :    []  Other:      Therapist: Jai Valadez PT    Date: 6/13/2019 Time: 10:37 AM     Future Appointments   Date Time Provider Louie Peng   6/20/2019 10:30 AM Slava Hurt, [facility]   6/20/2019  3:00 PM Kelli Coe MD [address] [facility]

## 2019-06-20 ENCOUNTER — HOSPITAL ENCOUNTER (OUTPATIENT)
Dept: PHYSICAL THERAPY | Age: 34
Discharge: HOME | End: 2019-06-20
Payer: OTHER GOVERNMENT

## 2019-06-20 PROCEDURE — 97110 THERAPEUTIC EXERCISES: CPT | Performed by: PHYSICAL THERAPIST

## 2019-06-20 PROCEDURE — 97140 MANUAL THERAPY 1/> REGIONS: CPT | Performed by: PHYSICAL THERAPIST

## 2019-06-20 NOTE — PROGRESS NOTES
PHYSICAL THERAPY - DAILY TREATMENT NOTE     Patient Name: Simone Yip        Date: 2019  : 1985   YES Patient  Verified  Visit #:   12 (3)   of   12  Insurance: Payor:  / Plan: Greg Hooper 74 / Product Type:  /      In time: 5441 Out time: 1130   Total Treatment Time: 55     Medicare/[facility] Time Tracking (below)   Total Timed Codes (min):   1:1 Treatment Time:       TREATMENT AREA =  Pelvic and perineal pain [R10.2]    SUBJECTIVE    Pain Level (on 0 to 10 scale):  7  / 10   Medication Changes/New allergies or changes in medical history, any new surgeries or procedures? NO    If yes, update Summary List   Subjective Functional Status/Changes:  []  No changes reported       Functional improvements: Feels she's 55-60% better and has increased ability to properly void without bearing down which created more pain and spasms. Has learned a lot about how to approach pain during sex and is learning how to stretch out her vaginal area. Functional impairments: Continually experiences pain inside about 5-12 x a week that occurs suddenly and feels like sand paper inside and is extremely painful. OBJECTIVE  8 min Therapeutic Exercise:  [x]  See flow sheet   Rationale:      increase ROM and increase strength to improve the patients ability to  improve patient's ability to perform ADL's with decreased pain         47 min Manual Therapy: Technique:      Visceral manipulation to the patient's uterus, bladder, R ovary, duodenum, stomach and cardiac sphincter.      Rationale:      decrease pain, increase ROM and increase tissue extensibility to improve patient's ability to  improve patient's ability to perform ADL's with decreased pain          Billed With/As:   [x] TE   [] TA   [] Neuro   [] Self Care Patient Education: [x] Review HEP    [] Progressed/Changed HEP based on:   [] positioning   [] body mechanics   [] transfers   [] heat/ice application    [] other:        Other Objective/Functional Measures:    Spasms to stomach and lower pelvic area     Post Treatment Pain Level (on 0 to 10) scale:   3  / 10     ASSESSMENT    Assessment/Changes in Function:     Good progress reporting 55-60% improvement and less abdominal pain/spasms as she has progressed through PT. She would benefit and is agreeable to reducing PT to every other week and hopes to begin treatment for low back pain soon. []  See Progress Note/Recertification   Patient will continue to benefit from skilled PT services to modify and progress therapeutic interventions, address functional mobility deficits, address ROM deficits, address strength deficits, analyze and address soft tissue restrictions, analyze and cue movement patterns, analyze and modify body mechanics/ergonomics, assess and modify postural abnormalities and address imbalance/dizziness to attain remaining goals. Progress toward goals / Updated goals: The pt is able to have intercourse and able to utilize relaxation techniques she's learned in PT to reduce painful spasms. Progressing towards DC for pelvic floor.      PLAN    [x]  Upgrade activities as tolerated YES Continue plan of care   []  Discharge due to :    []  Other:      Therapist: Austin Montalvo PT    Date: 6/20/2019 Time: 10:35 AM     Future Appointments   Date Time Provider Louie Peng   6/20/2019  3:00 PM Darío Coe MD [address] [facility]

## 2019-08-06 NOTE — PROGRESS NOTES
[address] [facility] PHYSICAL THERAPY AT [facility] [address] [place], [address] [facility]  Phone: [phone]  Fax: [phone] SUMMARY  Patient Name: Martínez Simmons : 1985   Treatment/Medical Diagnosis: Pelvic and perineal pain [R10.2]   Referral Source: Barry Spar *     Date of Initial Visit: 18 Attended Visits: 25 Missed Visits: 8      Previous Goals:  1) Patient to be confident in self-management of symptoms via HEP and through patient education. 2) Patient will report <4/10 pain with all ADLs, including intercourse.  Able to void with ease and without leakage. 3) Decrease score on FOTO/Pelvic Pain to < or = 57 to indicate improved quality of life.    4) Patient to demo of PF mm < or = 6 mv for relaxed voiding and painfree ADLs. Prior Level/Current Level:  1) Prior Level: patient independent in HEP   Current Level: Patient > or = 75% independent in symptom management    Goal Met? yes  2) Prior Level: <4/10 pain   Current Level: presents on 19 with 7/10   Goal Met? no  3) Prior Level: FOTO 67   Current Level: NA   Goal Met? no  4) Prior Level: resting tone on average 10mV, with least tone evident in R s/l   Current Level: NA   Goal Met? no     Key Functional Changes/Progress: The patient did not return to therapy following 19 appointment, and has not been able to be formally reassessed. RECOMMENDATIONS  Discontinue therapy due to lack of attendance or compliance. If you have any questions/comments please contact us directly at [phone]. Thank you for allowing us to assist in the care of your patient.     Therapist Signature: Lanny Kapoor, PT, DPT, MTC, CMTPT Date: 19     Time: 11:12 AM

## 2019-08-12 ENCOUNTER — APPOINTMENT (OUTPATIENT)
Dept: PHYSICAL THERAPY | Age: 34
End: 2019-08-12

## 2019-08-13 NOTE — PROGRESS NOTES
PF DAILY TREATMENT NOTE 3-16    Patient Name: Brandi Phelps  Date:2019  : 1985  [x]  Patient  Verified  Payor: ANTONETTE / Plan: Greg Hooper 74 / Product Type: [address] /    In time:1030  Out time:1115  Total Treatment Time (min): 45  Visit #: 22 of 24    Medicare/[employer] Only   Total Timed Codes (min):   1:1 Treatment Time:       Treatment Area: [x] Pelvic Floor     [] Other:    SUBJECTIVE  Pain Level (0-10 scale): 4  Any medication changes, allergies to medications, adverse drug reactions, diagnosis change, or new procedure performed?: [x] No    [] Yes (see summary sheet for update)  Subjective functional status/changes:   [] No changes reported    Functional improvement: increased intercourse frequency has decreased pain and vaginismus  Functional limitations: pain    OBJECTIVE     20 min Therapeutic Activity:  [x]  See flow sheet :    []  Increase Tissue extensibility        []  Assess fiber intake    []  Assess voiding habits  []  Assess bowel habits  [x]  Other: HEP, reassessment tools, pain management strategies   Rationale: increase ROM, increase strength, improve coordination and increase proprioception  to improve the patients ability to perform ADLs without pain    25 min Manual Therapy:  IASTM and DTM to B adductors f/b passive stretching   Rationale: decrease pain, increase ROM, increase tissue extensibility, decrease trigger points and increase postural awareness to perform ADLs without pain           With   [] TE   [x] TA   [] neuro  [x] manual   [] other: Patient Education: [x] Review HEP    [] Progressed/Changed HEP based on:   [] positioning   [] body mechanics   [] transfers   [] heat/ice application    [] other:      Other Objective/Functional Measures:   []Biofeedback  baseline resting tone:   slow twitch mms   fast twitch mms    []PERF:     Patient presents with 4/10 pain level today, she states it's been worse this week due to menstrual cycle, but before that, pain was S/p Lt Total Knee Revision  Admitted via bed. Oriented to room. Spouse at bedside. Updated on plan of care. actually less. Patient deferred FOTO to next visit due to emotional stress. Patient also deferred use of IFC and moist heat for pain due to emotional stress. Pain Level (0-10 scale) post treatment: 4, nausea    ASSESSMENT/Changes in Function: patient has maintatined status while out of therapy x 1 month. She is very hesitatnt to plan for DC due to continued presentation of symptoms and the improvement of symptoms with use of skilled PT. She should be ready for DC in 4-6 weeks. []  Decrease # of leaks   [] No change []  Improving [] Resolved     [x]  Decrease hypertonus [x] No change [x]  Improving [] Resolved     []  Increase void interval [] No change []  Improving [] Resolved     []  Increase PF strength [] No change []  Improving [] Resolved     []  Increase PF endurance [] No change []  Improving [] Resolved     []  Increase endurance [] No change []  Improving [] Resolved     []  Decrease # of pads [] No change []  Improving [] Resolved     [x]  Decrease pain [x] No change [x]  Improving [] Resolved     []  Increased coordination [] No change []  Improving [] Resolved     []  Increased Bowel Frequency [] No change []  Improving [] Resolved       Patient will continue to benefit from skilled PT services to modify and progress therapeutic interventions, address functional mobility deficits, address ROM deficits, address strength deficits, analyze and address soft tissue restrictions, analyze and cue movement patterns, analyze and modify body mechanics/ergonomics, assess and modify postural abnormalities and instruct in home and community integration to attain remaining goals.      []  See Plan of Care  [x]  See progress note/recertification  []  See Discharge Summary         Progress towards goals / Updated goals:  See PN    PLAN  [x]  Upgrade activities as tolerated     [x]  Continue plan of care  []  Update interventions per flow sheet       []  Discharge due to:_  []  Other:_      Jemima Ordoñez PT 6/4/2019  10:37 AM    Future Appointments   Date Time Provider Louie Gomezi   6/4/2019 11:00 AM Tamera Yoon, PT [facility]   6/6/2019 10:00 AM Tamie Hurt, PT [facility] AT [facility]   6/13/2019 10:30 AM Ana Maria Francis, PT [facility] AT [facility]   6/20/2019 10:30 AM Ana Maria Francis, PT [facility] AT [facility]   6/20/2019  3:00 PM Thanh Coe MD [address]

## 2019-09-09 ENCOUNTER — HOSPITAL ENCOUNTER (OUTPATIENT)
Dept: PHYSICAL THERAPY | Age: 34
Discharge: HOME | End: 2019-09-09
Payer: OTHER GOVERNMENT

## 2019-09-09 PROCEDURE — 97110 THERAPEUTIC EXERCISES: CPT

## 2019-09-09 PROCEDURE — 97162 PT EVAL MOD COMPLEX 30 MIN: CPT

## 2019-09-09 PROCEDURE — 97140 MANUAL THERAPY 1/> REGIONS: CPT

## 2019-09-09 NOTE — PROGRESS NOTES
PHYSICAL THERAPY - DAILY TREATMENT NOTE     Patient Name: Marti Wilde        Date: 2019  : 1985   YES Patient  Verified  Visit #:   1     Insurance: Payor:  / Plan: Brittany Caceres / Product Type:  /      In time: 910 Out time: 1110   Total Treatment Time: 60     Medicare/[facility] Time Tracking (below)   Total Timed Codes (min):   1:1 Treatment Time:       TREATMENT AREA =  Back pain [M54.9]    SUBJECTIVE    Pain Level (on 0 to 10 scale):  -7  / 10   Medication Changes/New allergies or changes in medical history, any new surgeries or procedures?     NO    If yes, update Summary List   Subjective Functional Status/Changes:  []  No changes reported     See eval /POC         OBJECTIVE  Modalities Rationale:     decrease pain to improve patient's ability to return to PLOF      min [] Estim, type/location:                                      []  att     []  unatt     []  w/US     []  w/ice    []  w/heat    min []  Mechanical Traction: type/lbs                   []  pro   []  sup   []  int   []  cont    []  before manual    []  after manual    min []  Ultrasound, settings/location:      min []  Iontophoresis w/ dexamethasone, location:                                               []  take home patch       []  in clinic   10 min []  Ice     []  Heat    location/position:     min []  Vasopneumatic Device, press/temp:     min []  Other:    [x] Skin assessment post-treatment (if applicable):    [x]  intact    [x]  redness- no adverse reaction     []redness  adverse reaction:      10 min Therapeutic Exercise:  [x]  See flow sheet   Rationale:      increase ROM and increase strength to improve the patients ability to return to PLOF     10 min Manual Therapy: Technique:      [x] S/DTM []IASTM []PROM [] Passive Stretching   [x]manual TPR    []Jt manipulation:Gr I [] II []  III [] IV[]  []REIL with manual OP  Treatment Area:     Rationale:      decrease pain, increase ROM, increase tissue extensibility and decrease trigger points to improve patient's ability to return to PLOF     min Neuromuscular Re-ed: [x]  See flow sheet   Rationale:      improve coordination, improve balance, increase proprioception and dec dizziness to improve the patients ability to return to PLOF        min Self Care:    Rationale:    increase ROM, increase strength and improve coordination to improve the patients ability to return to PLOF    Billed With/As:   [] TE   [] TA   [] Neuro   [] Self Care Patient Education: [x] Review HEP    [] Progressed/Changed HEP based on:   [] positioning   [] body mechanics   [] transfers   [] heat/ice application    [] other:        Other Objective/Functional Measures:    See eval/ POC     Post Treatment Pain Level (on 0 to 10) scale:   <6  / 10     ASSESSMENT    X  See POC     PLAN    [x]  Upgrade activities as tolerated {YES) Continue plan of care   []  Discharge due to :    []  Other:      Therapist: Sacha Julien PT    Date: 9/9/2019 Time: 10:10 AM   No future appointments.

## 2019-09-09 NOTE — PROGRESS NOTES
[address]   PHYSICAL THERAPY AT [facility] [address] [place], [address] - Phone: [phone]  Fax: [phone] / [address]  Patient Name: Shanta Hubbard : 1985   Medical   Diagnosis: Back pain [M54.9] Treatment Diagnosis: Back/ neck pain   Onset Date:      Referral Source: Umm Powers MD [facility]): 2019   Prior Hospitalization: See medical history Provider #: [id number]   Prior Level of Function: Ongoing pain with ADLs   Comorbidities: Fibromyalgia, bi-polar disorder, incontinence, PTSD, scoliosis, latex allergy   Medications: Verified on Patient Summary List   The Plan of Care and following information is based on the information from the initial evaluation.   ================================================================  Assessment / key information: Shanta Hubbard is a 30 y.o.  yo female with Dx of Back pain [M54.9]. She reports onset of neck pain as  following tear to the right UT. She has had ongoing neck and back pain/ spasming since this time. Pain is rated 6/10 at present. On assessment, Shanta Hubbard demonstrates:   Lx ROM as follows: flex= WNL, ext= WNL, R ROT=  Sign decrease, L ROT= significant decrease. LE strength and ROM are WNLs. .  SLR test is negative. Cx ROM: flex= 70, ext= 35, R/L SB= 27/40, R/L rot= 67/55 degrees. UE strength and ROM are WNLs. Multiple trigger points/ increased tone is present throughout the paraspinal regions of CS, TS, LS.   FOTO score= 1%.  ================================================================  Eval Complexity: History HIGH Complexity :3+ comorbidities / personal factors will impact the outcome/ POC ;  Examination  MEDIUM Complexity : 3 Standardized tests and measures addressing body structure, function, activity limitation and / or participation in recreation ; Presentation MEDIUM Complexity : Evolving with changing characteristics ; Decision Making HIGH Complexity : FOTO score of 1- 25 ; Overall Complexity MEDIUM  Problem List: pain affecting function, decrease ROM, decrease ADL/ functional abilitiies, decrease activity tolerance and decrease flexibility/ joint mobility   Treatment Plan may include any combination of the following: Therapeutic exercise, Therapeutic activities, Neuromuscular re-education, Physical agent/modality, Manual therapy, Patient education and Other: dry needling  Patient / Family readiness to learn indicated by: asking questions, trying to perform skills and interest  Persons(s) to be included in education: patient (P)  Barriers to Learning/Limitations: None  Measures taken, if barriers to learning:    Patient Goal (s): Learn to control pain, what workouts to do, proper stretching   Patient self reported health status: good  Rehabilitation Potential: good   Short Term Goals: To be accomplished in  2  weeks:  1 Patient will report >= 25% improvement in symptoms with ADLs. 2 Patient will be educated in appropriate ROM, stabilization, strengthening exercises. 3 Patient will be educated in good posture/ body mechanics/ ergonomics for improved ADLs/work activity.  Long Term Goals: To be accomplished in  4-6  weeks:  1 Patient to report >= 70% improvement in symptoms with ADLs. 2 Patient will be independent with finalized HEP/ self maintenance. 3 Increase FOTO score >=  35% to indicate improved function with use of CS/ LS.  4 Restore > 75% of full AROM for improved ADL participation.     Frequency / Duration:   Patient to be seen  2  times per week for 4-6  weeks:  Patient / Caregiver education and instruction: self care, activity modification and exercises    Therapist Signature: Evaline Skiff, PT Date: 8/1/4148   Certification Period:  Time: 10:11 AM   ===================================================================  I certify that the above Physical Therapy Services are being furnished while the patient is under my care. I agree with the treatment plan and certify that this therapy is necessary. Physician Signature:        Date:       Time:     Please sign and return to [facility] [place] or you may fax the signed copy to [phone]. Thank you.

## 2019-09-09 NOTE — PROGRESS NOTES
Jack Perez 33  [facility] PHYSICAL THERAPY AT [address] [place] [address], [address] [place] [address], [address] [place] [address]  Phone: [phone]  Fax: [phone]  Request for use of Dry Needling/Intramuscular Manual Therapy  Patient Name: Mercedes Lewis : 1985   Treatment/Medical Diagnosis: Back pain [M54.9]   Referral Source: Ondina Lea MD     Date of Initial Visit: 19 Attended Visits: 1 Missed Visits: -     Based on findings from the physical therapy examination and evaluation, the evaluating therapist believes the patient, Mercedes Lewis would benefit from including Dry Needling as part of the plan of care. Dry needling is an effective treatment technique utilized in conjunction with other physical therapy interventions to inactivate myofascial trigger points and the pain and dysfunction they cause. It involves the use of a very fine (usually 0.3 mm/30 gauge) solid filament sterile needle (also used for acupuncture) which is inserted into the skin and directly into a myofascial trigger point. Repeated strokes or movements of the needle without completely withdrawing it help to inactive the trigger point, all of which may take approximately 30 - 60 seconds at each site. Benefits include inactivation of trigger points, decreased pain, increased muscle length, improved movement patterns, and restoration of function. Potential risks include the following: post-needling soreness, infection, bruising/bleeding, penetration of a nerve, and pneumothorax. All treating therapist have been thoroughly educated in ways to avoid the adverse reactions. Dry Needling is an advanced procedure that requires additional training including greater than 54 hours of intensive course work.  Most treatment programs will consist of one session of dry needling each week and a possible second treatment to consist of muscle re-education, flexibility, strengthening and other manual techniques to facilitate the benefits from the dry needling therapy. If you agree with this recommendation, please sign the attached prescription form and fax it to us at [phone]. If you have questions or concerns regarding dry needling or any other treatment we may be providing, please contact us at [phone]. Thank you for allowing us to assist in the care of your patient. Therapist Signature: Sena Suarez PT Date: 9/9/2019     Time: 10:21 AM   NOTE TO PHYSICIAN:  PLEASE COMPLETE THE ORDERS BELOW AND FAX TO   [facility]: [phone]  If you are unable to process this request in 24 hours please contact our office: [phone]    ? I have read the above request and AGREE to the recommendation of including dry needling as part of the plan of care. ? I have read the above request and DO NOT AGREE to including dry needling as part of the plan of care.    ? I have read the above report and request that my patient continue therapy with the following changes/special instructions: _________________________________________________     Physician Signature:        Date:       Time:

## 2019-09-25 ENCOUNTER — APPOINTMENT (OUTPATIENT)
Dept: PHYSICAL THERAPY | Age: 34
End: 2019-09-25
Payer: OTHER GOVERNMENT

## 2019-09-27 ENCOUNTER — APPOINTMENT (OUTPATIENT)
Dept: PHYSICAL THERAPY | Age: 34
End: 2019-09-27
Payer: OTHER GOVERNMENT

## 2019-09-30 ENCOUNTER — APPOINTMENT (OUTPATIENT)
Dept: PHYSICAL THERAPY | Age: 34
End: 2019-09-30
Payer: OTHER GOVERNMENT

## 2019-10-03 ENCOUNTER — HOSPITAL ENCOUNTER (OUTPATIENT)
Dept: PHYSICAL THERAPY | Age: 34
Discharge: HOME | End: 2019-10-03
Payer: OTHER GOVERNMENT

## 2019-10-03 PROCEDURE — 97110 THERAPEUTIC EXERCISES: CPT

## 2019-10-03 PROCEDURE — 97140 MANUAL THERAPY 1/> REGIONS: CPT

## 2019-10-03 NOTE — PROGRESS NOTES
PHYSICAL THERAPY - DAILY TREATMENT NOTE     Patient Name: Taryn Oneill        Date: 10/3/2019  : 1985   YES Patient  Verified  Visit #:   2   of     Insurance: Payor:  / Plan: Greg Hooper 74 / Product Type:  /      In time: 511 Out time: 1010   Total Treatment Time: 35     Medicare/[facility] Time Tracking (below)   Total Timed Codes (min):   1:1 Treatment Time:       TREATMENT AREA =  Back pain [M54.9]    SUBJECTIVE    Pain Level (on 0 to 10 scale):  5  / 10   Medication Changes/New allergies or changes in medical history, any new surgeries or procedures?     NO    If yes, update Summary List   Subjective Functional Status/Changes:  []  No changes reported   Sick--caught something from my     Need new copies of ex           OBJECTIVE      10 min Manual Therapy: Technique:      [x] S/DTM []IASTM []PROM [] Passive Stretching   [x]manual TPR  [] SOR [] man traction  []Jt manipulation:Gr I [] II []  III [] IV[]   [] OP with REIL    []   Treatment Area:  Thor/ Lx PVMs   Rationale:      decrease pain, increase ROM, increase tissue extensibility and decrease trigger points to improve patient's ability to perform ADLs with less pain    25 min Therapeutic Exercise:  [x]  See flow sheet   Rationale:      increase ROM and increase strength to improve the patients ability to perform ADLs with less pain         Billed With/As:   [] TE   [] TA   [] Neuro   [] Self Care Patient Education: [x] Review HEP    [] Progressed/Changed HEP based on:   [] positioning   [] body mechanics   [] transfers   [] heat/ice application    [] other:        Other Objective/Functional Measures:    Sign tone mid back PVMs    Added Cx rot   Post Treatment Pain Level (on 0 to 10) scale:   3  / 10     ASSESSMENT  Assessment/Changes in Function:      Min change--verbal cues to correct ex form/ technique           Patient will continue to benefit from skilled PT services to modify and progress therapeutic interventions, address functional mobility deficits, address ROM deficits, address strength deficits, analyze and address soft tissue restrictions and analyze and cue movement patterns to attain remaining goals.    Progress toward goals / Updated goals:    Fair Progress to    [x] STG    [x] LTG  2 as shown by pt report/ demo     []  See Progress Note/Recertification      PLAN    [x]  Upgrade activities as tolerated {YES) Continue plan of care   []  Discharge due to :    []  Other:      Therapist: Rozina Paredes PT    Date: 10/3/2019 Time: 9:36 AM     Future Appointments   Date Time Provider Louie Peng   10/7/2019 10:00 AM Lyric Wilhelm PT [facility] AT [facility]   10/9/2019 10:00 AM Lyric Wilhelm PT [facility] AT [facility]   10/14/2019 10:00 AM Lyric Wilhelm PT [facility] AT [facility]   10/18/2019 10:00 AM Mikki Silver PT REHAB CENTER AT [facility]

## 2019-10-09 ENCOUNTER — HOSPITAL ENCOUNTER (OUTPATIENT)
Dept: PHYSICAL THERAPY | Age: 34
Discharge: HOME | End: 2019-10-09
Payer: OTHER GOVERNMENT

## 2019-10-09 PROCEDURE — 97110 THERAPEUTIC EXERCISES: CPT

## 2019-10-09 PROCEDURE — 97140 MANUAL THERAPY 1/> REGIONS: CPT

## 2019-10-09 PROCEDURE — 97032 APPL MODALITY 1+ESTIM EA 15: CPT

## 2019-10-09 NOTE — PROGRESS NOTES
PHYSICAL THERAPY - DAILY TREATMENT NOTE - DRY NEEDLE NOTE    Patient Name: Demacro Thao        Date: 10/9/2019  : 1985   YES  Patient  Verified  Visit #:   3   of     Insurance: Payor: ANTONETTE / Plan: Greg Hooper 74 / Product Type:  /      In time: 950 Out time: 1587   Total Treatment Time: 40     Medicare Time Tracking (below)   Total Timed Codes (min):   1:1 Treatment Time:       TREATMENT AREA = Back pain [M54.9]    SUBJECTIVE  Pain Level (on 0 to 10 scale):  6  / 10   Medication Changes/New allergies or changes in medical history, any new surgeries or procedures? NO    If yes, update Summary List   Subjective Functional Status/Changes:  []  No changes reported     Back pain is elevated- not sure why. Maybe stress.             OBJECTIVE  Modalities Rationale:     decrease pain, increase tissue extensibility, decrease trigger point density and increase stability/ ROM within muscle to improve patient's ability to perform ADLs/ return to PLOF  10 min [x] Estim, type/location: Direct using Pointer Excel II ( between 1-16 HZ) to DN                                     [x]  att     []  unatt     [x]  W/dry needling     []  w/ice    []  w/heat    min []  Mechanical Traction: type/lbs                   []  pro   []  sup   []  int   []  cont    []  before manual    []  after manual    min []  Ultrasound, settings/location:      min []  Iontophoresis w/ dexamethasone, location:                                               []  take home patch       []  in clinic   10 min []  Ice     [x]  Heat    location/position:     min []  Vasopneumatic Device, press/temp:     min []  Other:    [x] Skin assessment post-treatment (if applicable):    [x]  intact    [x]  redness- no adverse reaction     []redness  adverse reaction:        10 min Manual Therapy: Palpation, assessment of TP and DN -(needle insertion time not included)    Rationale:     decrease pain, increase tissue extensibility, decrease trigger point density and increase stability within muscle to improve patient's ability to perform ADLs/ return to PLOF    10 min Therapeutic Exercise:  [x]  See flow sheet   Modalities Rationale:     decrease pain, increase tissue extensibility, decrease trigger point density and increase stability within muscle to improve patient's ability to perform ADLs/ return to PLOF      Billed With/As:   [] TE   [x] MT   [] Neuro   [x] modalities Patient Education: [x] Review HEP    [] Progressed/Changed HEP based on:   [] positioning   [] body mechanics   [x] indication/ precautions/ expectations of DN    [] heat/ice application    [x] other: sitting posture         OTHER OBJECTIVE/FUNCTIONAL MEASURES:    Dry Needling Procedure Note    Dry Needle Session Number:  1    Procedure: An intramuscular manual therapy (dry needling) and a neuro-muscular re-education treatment was done to deactivate myofascial trigger points, with a 15/30 gauge solid filament needle, under aseptic technique. Indication(s): [x] Muscle spasms [x] Myalgia/Myositis  [] Muscle cramps      [] Muscle imbalances [] TMD (TMJ) [x] Myofascial pain & dysfunction     [] Other: __    Chart reviewed for the following:  Keisha ALBERT PT, have reviewed the medical history, summary list and precautions/contraindications for Nativis.     TIME OUT performed immediately prior to start of procedure:  1055 (enter time the timeout was completed)  Keisha ALBERT PT, have performed the following reviews on Nativis prior to the start of the session:      [x] Patient was identified by name and date of birth    [x] Agreement on all muscles being treated was verified   [x] Purpose of dry needling, side effects, possible complications, risks and benefits were explained to the patient   [x] Procedure site(s) verified  [x] Patient was positioned for comfort and draped for privacy  [x] Informed Consent was signed (initial visit) and verified verbally (subsequent visits)  [x] Patient was instructed on the need to report the use of blood thinners and/or immunosuppressant medications  [x] How to respond to possible adverse effects of treatment  [x] Self treatment of post needling soreness: ice, heat (moist heat, heat wraps) and stretching  [x] Opportunity was given to ask any questions, all questions were answered            Treatment:  The following muscles were treated today (needle insertion with with direct ESTIM):    Right: Thoracic PVM   Left: Thoracic PVM     Patients response to todays treatment:   [x]  LTRs  [x]  Muscle Relaxation  []  Pain Relief  []  Decreased Tinnitus  []  Decreased HAs [x]  Post needling soreness []  Increased ROM   []  Other:           Post Treatment Pain Level (on 0 to 10) scale:   4 / 10     ASSESSMENT  Assessment/Changes in Function:     Good tolerance for DN.       []  See Progress Note/Recertification   Patient will continue to benefit from skilled PT services to modify and progress therapeutic interventions, address functional mobility deficits, address ROM deficits, address strength deficits, analyze and address soft tissue restrictions and analyze and cue movement patterns to attain remaining goals.    Progress toward goals / Updated goals:    Pt reports compliance with HEP     PLAN  [x]  Upgrade activities as tolerated YES  Continue plan of care   []  Discharge due to :    []  Other:      Therapist: Ashley Israel PT    Date: 10/9/2019 Time: 9:46 AM     Future Appointments   Date Time Provider Louie Peng   10/9/2019 10:00 AM Rivka Phelps PT [facility] AT [facility]   10/14/2019 10:00 AM Rivka Phelps, PT REH[facility]ENTER AT [facility]   10/18/2019 10:00 AM Tari Cameron PT REHAB [facility]ENTER AT [facility]

## 2019-10-14 ENCOUNTER — APPOINTMENT (OUTPATIENT)
Dept: PHYSICAL THERAPY | Age: 34
End: 2019-10-14
Payer: OTHER GOVERNMENT

## 2019-10-18 ENCOUNTER — APPOINTMENT (OUTPATIENT)
Dept: PHYSICAL THERAPY | Age: 34
End: 2019-10-18
Payer: OTHER GOVERNMENT

## 2019-10-28 NOTE — PROGRESS NOTES
[address] [place] [address] PHYSICAL THERAPY AT [address], [address] [place] [address], [address], [address] [place] [address]  Phone: [phone]  Fax: [phone] SUMMARY  Patient Name: Zia Maza : 1985   Treatment/Medical Diagnosis: Back pain [M54.9]   Referral Source: Chela Espino MD     Date of Initial Visit: 19 Attended Visits: 3 Missed Visits: several     SUMMARY OF TREATMENT  PT has consisted of manual therapy, therapeutic exercise, dry needling with EStim, patient education  CURRENT STATUS  Zia Maza has been seen for PT treatment a total of 3 times, with several occasions of cancelling/ no-shows. Formal assessment towards LTGs not completed. Plan:   Discharge from Physical Therapy dur to attendance isues    If you have any questions/comments please contact us directly at [phone]. Thank you for allowing us to assist in the care of your patient.     Therapist Signature: Keisha Ornelas PT Date: 10/28/19     Time: 9:51 AM

## 2021-07-22 PROBLEM — L60.9 NAIL DISORDER: Status: ACTIVE | Noted: 2019-07-11

## 2022-03-19 PROBLEM — L60.9 NAIL DISORDER: Status: ACTIVE | Noted: 2019-07-11
